# Patient Record
Sex: MALE | Race: WHITE | NOT HISPANIC OR LATINO | Employment: OTHER | URBAN - NONMETROPOLITAN AREA
[De-identification: names, ages, dates, MRNs, and addresses within clinical notes are randomized per-mention and may not be internally consistent; named-entity substitution may affect disease eponyms.]

---

## 2018-04-17 ENCOUNTER — OFFICE VISIT (OUTPATIENT)
Dept: CARDIOLOGY | Facility: CLINIC | Age: 63
End: 2018-04-17

## 2018-04-17 VITALS
BODY MASS INDEX: 45.1 KG/M2 | SYSTOLIC BLOOD PRESSURE: 134 MMHG | HEART RATE: 97 BPM | DIASTOLIC BLOOD PRESSURE: 84 MMHG | WEIGHT: 315 LBS | HEIGHT: 70 IN

## 2018-04-17 DIAGNOSIS — I48.20 CHRONIC ATRIAL FIBRILLATION (HCC): Primary | ICD-10-CM

## 2018-04-17 DIAGNOSIS — IMO0001 CLASS 3 OBESITY DUE TO EXCESS CALORIES WITHOUT SERIOUS COMORBIDITY WITH BODY MASS INDEX (BMI) OF 45.0 TO 49.9 IN ADULT: ICD-10-CM

## 2018-04-17 DIAGNOSIS — I50.22 CHRONIC SYSTOLIC CONGESTIVE HEART FAILURE (HCC): ICD-10-CM

## 2018-04-17 DIAGNOSIS — I10 ESSENTIAL HYPERTENSION: ICD-10-CM

## 2018-04-17 PROCEDURE — 99204 OFFICE O/P NEW MOD 45 MIN: CPT | Performed by: INTERNAL MEDICINE

## 2018-04-17 PROCEDURE — 93000 ELECTROCARDIOGRAM COMPLETE: CPT | Performed by: INTERNAL MEDICINE

## 2018-04-17 RX ORDER — ZINC GLUCONATE 50 MG
1 TABLET ORAL DAILY
COMMUNITY

## 2018-04-17 RX ORDER — LISINOPRIL 20 MG/1
20 TABLET ORAL DAILY
Status: ON HOLD | COMMUNITY
End: 2019-08-01

## 2018-04-17 RX ORDER — CARVEDILOL 25 MG/1
6.25 TABLET ORAL 2 TIMES DAILY WITH MEALS
Status: ON HOLD | COMMUNITY
End: 2019-08-01

## 2018-04-17 RX ORDER — ASPIRIN 81 MG/1
81 TABLET ORAL DAILY
COMMUNITY

## 2018-04-17 RX ORDER — UBIDECARENONE 50 MG
1 CAPSULE ORAL 2 TIMES DAILY
COMMUNITY

## 2018-04-17 RX ORDER — DICLOFENAC SODIUM 75 MG/1
75 TABLET, DELAYED RELEASE ORAL 2 TIMES DAILY
COMMUNITY
End: 2019-08-05 | Stop reason: HOSPADM

## 2018-04-17 RX ORDER — DOCUSATE SODIUM 100 MG/1
100 CAPSULE, LIQUID FILLED ORAL DAILY PRN
COMMUNITY

## 2018-04-17 RX ORDER — POTASSIUM CHLORIDE 750 MG/1
10 TABLET, EXTENDED RELEASE ORAL DAILY
COMMUNITY

## 2018-04-17 RX ORDER — FUROSEMIDE 40 MG/1
40 TABLET ORAL DAILY
COMMUNITY

## 2018-04-17 NOTE — PROGRESS NOTES
Arsh Salas  62 y.o. male    04/17/2018  1. Class 3 obesity due to excess calories without serious comorbidity with body mass index (BMI) of 45.0 to 49.9 in adult    2. Essential hypertension    3. Chronic systolic congestive heart failure    4. Chronic atrial fibrillation        History of Present Illness    62 years old patient with morbid obesity BMI 48, chronic atrial fibrillation with a variable ventricular response, congestive heart failure, obstructive sleep apnea on CPAP, LV dysfunction with ejection fraction 35% on echocardiographic study done in March 2018 with a moderate biatrial enlargement meter 5.2 along with a mild mitral and tricuspid regurgitation who referred for further evaluations and management.  The patient complained by his daughter and son-in-law well educated.  No cardiac evaluations of LV dysfunction palpation performed.  He is on appropriate medical management.  No chest pain reported.  No orthopnea PND was reported.  He lost proximate 5 pounds recently.  He had history of chronic back pain and bilateral knee arthritis.  He has a back surgery done in the past.  He still able to ambulate.        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 No pertinent family history.      Social History     Social History   • Marital status:      Spouse name: N/A   • Number of children: N/A   • Years of education: N/A     Occupational History   • Not on file.     Social History Main Topics   • Smoking status: Former Smoker     Quit  "date: 1978   • Smokeless tobacco: Never Used   • Alcohol use Yes      Comment: social   • Drug use: No   • Sexual activity: Defer     Other Topics Concern   • Not on file     Social History Narrative   • No narrative on file         Current Outpatient Prescriptions   Medication Sig Dispense Refill   • aspirin 81 MG EC tablet Take 81 mg by mouth Daily.     • carvedilol (COREG) 25 MG tablet Take 25 mg by mouth 2 (Two) Times a Day With Meals.     • Cholecalciferol (VITAMIN D3) 5000 units capsule capsule Take 5,000 Units by mouth Daily.     • diclofenac (VOLTAREN) 75 MG EC tablet Take 75 mg by mouth 2 (Two) Times a Day.     • docusate sodium (COLACE) 100 MG capsule Take 100 mg by mouth Daily As Needed for Constipation.     • furosemide (LASIX) 40 MG tablet Take 40 mg by mouth Daily.     • lisinopril (PRINIVIL,ZESTRIL) 20 MG tablet Take 20 mg by mouth Daily.     • potassium chloride (K-DUR,KLOR-CON) 10 MEQ CR tablet Take 10 mEq by mouth Daily.     • Red Yeast Rice 600 MG tablet Take 1 tablet by mouth 2 (Two) Times a Day.     • rivaroxaban (XARELTO) 20 MG tablet Take 20 mg by mouth Daily.     • Zinc 50 MG tablet Take 1 tablet by mouth Daily.       No current facility-administered medications for this visit.          OBJECTIVE    /84   Pulse 97   Ht 177.8 cm (70\")   Wt (!) 152 kg (334 lb)   BMI 47.92 kg/m²         Review of Systems     Constitutional:  Denies recent weight loss, weight gain, fever or chills, no change in exercise tolerance     HENT:  Denies any hearing loss, epistaxis, hoarseness, or difficulty speaking.     Eyes: Wears eyeglasses or contact lenses     Respiratory:  Obstructive sleep apnea on CPAP    Cardiovascular: The H&P    Gastrointestinal:  Denies change in bowel habits, dyspepsia, ulcer disease, hematochezia, or melena.     Endocrine: Negative for cold intolerance, heat intolerance, polydipsia, polyphagia and polyuria. Denies any history of weight change, heat/cold intolerance, polydipsia, " polyuria     Genitourinary: Negative.      Musculoskeletal: Chronic back pain status post surgery and bilateral knee arthritis    Skin:  Denies any change in hair or nails, rashes, or skin lesions.     Allergic/Immunologic: Negative.  Negative for environmental allergies, food allergies and immunocompromised state.     Neurological:  Denies any history of recurrent headaches, strokes, TIA, or seizure disorder.     Hematological: Denies any food allergies, seasonal allergies, bleeding disorders, or lymphadenopathy.     Psychiatric/Behavioral: Denies any history of depression, substance abuse, or change in cognitive function.         Physical Exam     Constitutional: Cooperative, alert and oriented, well-developed, well-nourished, in no acute distress.     HENT:   Head: Normocephalic, normal hair patterns, no masses or tenderness.  Ears, Nose, and Throat: No gross abnormalities. No pallor or cyanosis. Dentition good.   Eyes: EOMS intact, PERRL, conjunctivae and lids unremarkable. Fundoscopic exam and visual fields not performed.   Neck: No palpable masses or adenopathy, no thyromegaly, no JVD, carotid pulses are full and equal bilaterally and without  Bruits.     Cardiovascular: Regular rhythm, S1 and S2 normal, no S3 or S4. Apical impulse not displaced. No murmurs, gallops, or rubs detected.     Pulmonary/Chest: Chest: normal symmetry, no tenderness to palpation, normal respiratory excursion, no intercostal retraction, no use of accessory muscles.            Pulmonary: Normal breath sounds. No rales or ronchi.    Abdominal: Abdomen soft, bowel sounds normoactive, no masses, no hepatosplenomegaly, non-tender, no bruits.     Musculoskeletal: No deformities, clubbing, cyanosis, erythema, or edema observed. There are no spinal abnormalities noted. Normal muscle strength and tone. Pulses full and equal in all extremities, no bruits auscultated.     Neurological: No gross motor or sensory deficits noted, affect  appropriate, oriented to time, person, place.     Skin: Warm and dry to the touch, no apparent skin lesions or masses noted.     Psychiatric: He has a normal mood and affect. His behavior is normal. Judgment and thought content normal.         Procedures      No results found for: WBC, HGB, HCT, MCV, PLT  No results found for: GLUCOSE, BUN, CREATININE, EGFRIFNONA, EGFRIFAFRI, BCR, CO2, CALCIUM, PROTENTOTREF, ALBUMIN, LABIL2, AST, ALT  No results found for: CHOL  No results found for: TRIG  No results found for: HDL  No components found for: LDLCALC  No results found for: LDL  No results found for: HDLLDLRATIO  No components found for: CHOLHDL  No results found for: HGBA1C  No results found for: TSH, U9YARHS, S9DLBLS, THYROIDAB        ASSESSMENT AND PLAN  #1 congestive heart failure due to reduced left and a systolic function #2 morbid obesity with a BMI of 48 #3 hypertension with hypertensive disease #4 chronic atrial fibrillation with moderate biatrial enlargement #5  exertional dyspnea multifactorial mainly in etiology.    Clinically, no sign of cardiac decompensation at the time of evaluations.  Currently patient is being treated with Xarelto to decrease risk of cardiac embolic phenomena, lisinopril with history of hypertension hypertensive heart disease, coronary with history of LV dysfunction congestive heart failure compensated, Lasix for CHF.  [facility] given the LV dysfunction history of congestive heart failure with ejection fraction 35%.  I will also arrange 24-hour Holter to assess the heart rate control to see if need to made any adjustments medication needed at the AV emely blocking drug.  Risk Factor lifestyle/ modification discussed.  Patient was educated regarding losing weight and Dash diet explained.    Diagnoses and all orders for this visit:    Class 3 obesity due to excess calories without serious comorbidity with body mass index (BMI) of 45.0 to 49.9  in adult    Essential hypertension    Chronic systolic congestive heart failure  -     Stress Test With Myocardial Perfusion Two Day; Future    Chronic atrial fibrillation  -     Holter Monitor - 24 Hour; Future  -     Stress Test With Myocardial Perfusion Two Day; Future        Tim Hutchinson MD  4/17/2018  12:28 PM

## 2018-05-07 ENCOUNTER — APPOINTMENT (OUTPATIENT)
Dept: NUCLEAR MEDICINE | Facility: HOSPITAL | Age: 63
End: 2018-05-07
Attending: INTERNAL MEDICINE

## 2018-05-07 ENCOUNTER — TELEPHONE (OUTPATIENT)
Dept: GENERAL RADIOLOGY | Facility: HOSPITAL | Age: 63
End: 2018-05-07

## 2018-05-08 ENCOUNTER — APPOINTMENT (OUTPATIENT)
Dept: CARDIOLOGY | Facility: HOSPITAL | Age: 63
End: 2018-05-08
Attending: INTERNAL MEDICINE

## 2018-05-08 ENCOUNTER — HOSPITAL ENCOUNTER (OUTPATIENT)
Dept: NUCLEAR MEDICINE | Facility: HOSPITAL | Age: 63
Discharge: HOME | End: 2018-05-08
Attending: INTERNAL MEDICINE

## 2018-05-08 DIAGNOSIS — I50.22 CHRONIC SYSTOLIC CONGESTIVE HEART FAILURE (HCC): ICD-10-CM

## 2018-05-08 DIAGNOSIS — I48.20 CHRONIC ATRIAL FIBRILLATION (HCC): ICD-10-CM

## 2018-05-08 PROCEDURE — A9500 TC99M SESTAMIBI: HCPCS | Performed by: INTERNAL MEDICINE

## 2018-05-08 PROCEDURE — 0 TECHNETIUM SESTAMIBI: Performed by: INTERNAL MEDICINE

## 2018-05-08 RX ADMIN — TECHNETIUM TC 99M SESTAMIBI 1 DOSE: 1 INJECTION INTRAVENOUS at 08:25

## 2018-05-09 ENCOUNTER — HOSPITAL ENCOUNTER (OUTPATIENT)
Dept: CARDIOLOGY | Facility: HOSPITAL | Age: 63
Discharge: HOME | End: 2018-05-09
Attending: INTERNAL MEDICINE

## 2018-05-09 ENCOUNTER — HOSPITAL ENCOUNTER (OUTPATIENT)
Dept: NUCLEAR MEDICINE | Facility: HOSPITAL | Age: 63
Discharge: HOME | End: 2018-05-09
Attending: INTERNAL MEDICINE

## 2018-05-09 LAB
BH CV STRESS BP STAGE 1: NORMAL
BH CV STRESS COMMENTS STAGE 1: NORMAL
BH CV STRESS DOSE REGADENOSON STAGE 1: 0.4
BH CV STRESS DURATION MIN STAGE 1: 0
BH CV STRESS DURATION SEC STAGE 1: 10
BH CV STRESS HR STAGE 1: 92
BH CV STRESS PROTOCOL 1: NORMAL
BH CV STRESS RECOVERY BP: NORMAL MMHG
BH CV STRESS RECOVERY HR: 101 BPM
BH CV STRESS STAGE 1: 1
LV EF NUC BP: 69 %
MAXIMAL PREDICTED HEART RATE: 159 BPM
PERCENT MAX PREDICTED HR: 84.28 %
STRESS BASELINE BP: NORMAL MMHG
STRESS BASELINE HR: 89 BPM
STRESS PERCENT HR: 99 %
STRESS POST ESTIMATED WORKLOAD: 1 METS
STRESS POST PEAK BP: NORMAL MMHG
STRESS POST PEAK HR: 134 BPM
STRESS TARGET HR: 135 BPM

## 2018-05-09 PROCEDURE — 93016 CV STRESS TEST SUPVJ ONLY: CPT | Performed by: INTERNAL MEDICINE

## 2018-05-09 PROCEDURE — 25010000002 REGADENOSON 0.4 MG/5ML SOLUTION: Performed by: INTERNAL MEDICINE

## 2018-05-09 PROCEDURE — A9500 TC99M SESTAMIBI: HCPCS | Performed by: INTERNAL MEDICINE

## 2018-05-09 PROCEDURE — 78452 HT MUSCLE IMAGE SPECT MULT: CPT | Performed by: INTERNAL MEDICINE

## 2018-05-09 PROCEDURE — 0 TECHNETIUM SESTAMIBI: Performed by: INTERNAL MEDICINE

## 2018-05-09 PROCEDURE — 78452 HT MUSCLE IMAGE SPECT MULT: CPT

## 2018-05-09 PROCEDURE — 93017 CV STRESS TEST TRACING ONLY: CPT

## 2018-05-09 PROCEDURE — 93018 CV STRESS TEST I&R ONLY: CPT | Performed by: INTERNAL MEDICINE

## 2018-05-09 RX ORDER — 0.9 % SODIUM CHLORIDE 0.9 %
10 VIAL (ML) INJECTION AS NEEDED
Status: DISCONTINUED | OUTPATIENT
Start: 2018-05-09 | End: 2018-05-10 | Stop reason: HOSPADM

## 2018-05-09 RX ADMIN — REGADENOSON 0.4 MG: 0.08 INJECTION, SOLUTION INTRAVENOUS at 08:29

## 2018-05-09 RX ADMIN — SODIUM CHLORIDE, PRESERVATIVE FREE 10 ML: 5 INJECTION INTRAVENOUS at 08:29

## 2018-05-09 RX ADMIN — TECHNETIUM TC 99M SESTAMIBI 1 DOSE: 1 INJECTION INTRAVENOUS at 08:30

## 2018-05-17 ENCOUNTER — TELEPHONE (OUTPATIENT)
Dept: CARDIOLOGY | Facility: CLINIC | Age: 63
End: 2018-05-17

## 2018-07-17 ENCOUNTER — OFFICE VISIT (OUTPATIENT)
Dept: CARDIOLOGY | Facility: CLINIC | Age: 63
End: 2018-07-17

## 2018-07-17 VITALS
BODY MASS INDEX: 45.1 KG/M2 | HEART RATE: 95 BPM | HEIGHT: 70 IN | WEIGHT: 315 LBS | SYSTOLIC BLOOD PRESSURE: 112 MMHG | DIASTOLIC BLOOD PRESSURE: 72 MMHG

## 2018-07-17 DIAGNOSIS — I10 ESSENTIAL HYPERTENSION: ICD-10-CM

## 2018-07-17 DIAGNOSIS — IMO0001 CLASS 3 OBESITY DUE TO EXCESS CALORIES WITHOUT SERIOUS COMORBIDITY WITH BODY MASS INDEX (BMI) OF 45.0 TO 49.9 IN ADULT: ICD-10-CM

## 2018-07-17 DIAGNOSIS — I48.20 CHRONIC ATRIAL FIBRILLATION (HCC): Primary | ICD-10-CM

## 2018-07-17 DIAGNOSIS — I50.22 CHRONIC SYSTOLIC CONGESTIVE HEART FAILURE (HCC): ICD-10-CM

## 2018-07-17 PROCEDURE — 99213 OFFICE O/P EST LOW 20 MIN: CPT | Performed by: INTERNAL MEDICINE

## 2018-07-17 NOTE — PROGRESS NOTES
Arsh Salas  62 y.o. male    07/17/2018  1. Chronic atrial fibrillation (CMS/[facility])    2. Chronic systolic congestive heart failure (CMS/[facility])    3. Essential hypertension    4. Class 3 obesity due to excess calories without serious comorbidity with body mass index (BMI) of 45.0 to 49.9 in adult (CMS/[facility])        History of Present Illness    62 years old patient with morbid obesity BMI 46, chronic atrial fibrillation with a variable ventricular response, congestive heart failure, obstructive sleep apnea on CPAP, LV dysfunction with ejection fraction 35% on echocardiographic study done in March 2018 with a moderate biatrial enlargement with diameter 5.2 along with a mild mitral and tricuspid regurgitation who referred for further evaluations and management. Stress test done and reported ejection fractions greater than 60% and no evidence of any reversible ischemia.  following the stress and holter discussed with the patient.  he is a pleased with the clinical outcome.  adenosine any significant bradyarrhythmia or any persistent rapid ventricular heart rate.  his average heart rate is reasonably good  The patient complained by his daughter and son-in-law well educated.  No cardiac evaluations of LV dysfunction palpation performed.  He is on appropriate medical management.  No chest pain reported.  No orthopnea PND was reported.  He lost proximate 5 pounds recently.  He had history of chronic back pain and bilateral knee arthritis.  He has a back surgery done in the past but  able to ambulate      Stress test 5/9/20128  · Non diagonostic lexiscan  · EF-69%,Fixed apical defect, No reversible ischemia    Study Impression Myocardial perfusion imaging indicates a normal myocardial perfusion study with no evidence of ischemia. Impressions are consistent with a low risk study.      Rest Perfusion Defect 1 There is a small sized defect with mild reduction in uptake present in the apical wall.      Stress Perfusion Defect  1 There is a small sized defect of mild severity present in the apex wall.      Ventricle Size / Description Left ventricular ejection fraction is normal (Calculated EF = 69%).          HOLTER 5/2018  Predominant rhythm is atrial flutter/atrial fibrillation with average heart rate of 100 minimal 55 maximum 152 bpm.  There is a 1 nonsignificant pause of 2.1 second between 6:30 and 7 AM.  Rare isolated premature ventricular complex.   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 No pertinent family history.      Social History     Social History   • Marital status:      Spouse name: N/A   • Number of children: N/A   • Years of education: N/A     Occupational History   • Not on file.     Social History Main Topics   • Smoking status: Former Smoker     Quit date: 1978   • Smokeless tobacco: Never Used   • Alcohol use Yes      Comment: social   • Drug use: No   • Sexual activity: Defer     Other Topics Concern   • Not on file     Social History Narrative   • No narrative on file         Current Outpatient Prescriptions   Medication Sig Dispense Refill   • aspirin 81 MG EC tablet Take 81 mg by mouth Daily.     • carvedilol (COREG) 25 MG tablet Take 25 mg by mouth 2 (Two) Times a Day With Meals.     • Cholecalciferol (VITAMIN D3) 5000 units  "capsule capsule Take 5,000 Units by mouth Daily.     • diclofenac (VOLTAREN) 75 MG EC tablet Take 75 mg by mouth 2 (Two) Times a Day.     • docusate sodium (COLACE) 100 MG capsule Take 100 mg by mouth Daily As Needed for Constipation.     • furosemide (LASIX) 40 MG tablet Take 40 mg by mouth Daily.     • lisinopril (PRINIVIL,ZESTRIL) 20 MG tablet Take 20 mg by mouth Daily.     • potassium chloride (K-DUR,KLOR-CON) 10 MEQ CR tablet Take 10 mEq by mouth Daily.     • Red Yeast Rice 600 MG tablet Take 1 tablet by mouth 2 (Two) Times a Day.     • rivaroxaban (XARELTO) 20 MG tablet Take 20 mg by mouth Daily.     • Zinc 50 MG tablet Take 1 tablet by mouth Daily.       No current facility-administered medications for this visit.          OBJECTIVE    /72   Pulse 95   Ht 177.8 cm (70\")   Wt (!) 147 kg (323 lb)   BMI 46.35 kg/m²         Review of Systems     Constitutional:  Denies recent weight loss, weight gain, fever or chills, no change in exercise tolerance     HENT:  Denies any hearing loss, epistaxis, hoarseness, or difficulty speaking.     Eyes: No blurry     Respiratory:  Denies dyspnea with exertion,no cough, wheezing, or hemoptysis.     Cardiovascular: See H&P    Gastrointestinal:  Denies change in bowel habits, dyspepsia, ulcer disease, hematochezia, or melena.     Endocrine: Negative for cold intolerance, heat intolerance, polydipsia, polyphagia and polyuria. Denies any history of weight change, heat/cold intolerance, polydipsia, polyuria     Genitourinary: Negative.      Musculoskeletal: Denies any history of arthritic symptoms or back problems     Skin:  Denies any change in hair or nails, rashes, or skin lesions.     Allergic/Immunologic: Negative.  Negative for environmental allergies, food allergies and immunocompromised state.     Neurological:  Denies any history of recurrent headaches, strokes, TIA, or seizure disorder.     Hematological: Denies any food allergies, seasonal allergies, bleeding " disorders, or lymphadenopathy.     Psychiatric/Behavioral: Denies any history of depression, substance abuse, or change in cognitive function.         Physical Exam     Constitutional: Cooperative, alert and oriented, well-developed, well-nourished, in no acute distress.     HENT:   Head: Normocephalic, normal hair patterns, no masses or tenderness.  Ears, Nose, and Throat: No gross abnormalities. No pallor or cyanosis. Dentition good.   Eyes: EOMS intact, PERRL, conjunctivae and lids unremarkable. Fundoscopic exam and visual fields not performed.   Neck: No palpable masses or adenopathy, no thyromegaly, no JVD, carotid pulses are full and equal bilaterally and without  Bruits.     Cardiovascular: Regular rhythm, S1 and S2 normal, no S3 or S4. Apical impulse not displaced. No murmurs, gallops, or rubs detected.     Pulmonary/Chest: Chest: normal symmetry, no tenderness to palpation, normal respiratory excursion, no intercostal retraction, no use of accessory muscles.            Pulmonary: Normal breath sounds. No rales or ronchi.    Abdominal: Abdomen soft, bowel sounds normoactive, no masses, no hepatosplenomegaly, non-tender, no bruits.     Musculoskeletal: No deformities, clubbing, cyanosis, erythema, or edema observed. There are no spinal abnormalities noted. Normal muscle strength and tone. Pulses full and equal in all extremities, no bruits auscultated.     Neurological: No gross motor or sensory deficits noted, affect appropriate, oriented to time, person, place.     Skin: Warm and dry to the touch, no apparent skin lesions or masses noted.     Psychiatric: He has a normal mood and affect. His behavior is normal. Judgment and thought content normal.         Procedures      No results found for: WBC, HGB, HCT, MCV, PLT  No results found for: GLUCOSE, BUN, CREATININE, EGFRIFNONA, EGFRIFAFRI, BCR, CO2, CALCIUM, PROTENTOTREF, ALBUMIN, LABIL2, AST, ALT  No results found for: CHOL  No results found for: TRIG  No  results found for: HDL  No components found for: LDLCALC  No results found for: LDL  No results found for: HDLLDLRATIO  No components found for: CHOLHDL  No results found for: HGBA1C  No results found for: TSH, T7HXWOT, C6ONYQU, THYROIDAB        ASSESSMENT AND PLAN  #1 congestive heart failure due to reduced left and a systolic function #2 morbid obesity with a BMI of 48 #3 hypertension with hypertensive disease #4 chronic atrial fibrillation with moderate biatrial enlargement #5  exertional dyspnea multifactorial mainly in etiology.     Clinically, no sign of cardiac decompensation at the time of evaluations.  Currently patient is being treated with Xarelto to decrease risk of cardiac embolic phenomena, lisinopril with history of hypertension hypertensive heart disease, coronary with history of LV dysfunction congestive heart failure compensated, Lasix for CHF compensated with a significant improvement in heart function on stress test.   Stress tests ejection fraction improved to greater than 60% and no evidence of reversibility.  Holter no evidence of significant bradyarrhythmia or any pause noted with good average heart rate..  Risk Factor lifestyle/ modification discussed.  Patient was educated regarding losing weight and Dash diet explained patient will continue lisinopril for management of hypertension with hypertensive heart disease, carvedilol with history of hypertension and congestive heart failure as a part of AV emely blocking drug and Xarelto to decrease risk of cardiac embolic phenomena.  We'll see him back in 6 month R depends on patient clinical conditions.    Arsh was seen today for follow-up.    Diagnoses and all orders for this visit:    Chronic atrial fibrillation (CMS/HCC)    Chronic systolic congestive heart failure (CMS/HCC)    Essential hypertension    Class 3 obesity due to excess calories without serious comorbidity with body mass index (BMI) of 45.0 to 49.9 in adult  (CMS/HCC)        Tim Hutchinson MD  7/17/2018  11:00 AM

## 2019-01-18 ENCOUNTER — OFFICE VISIT (OUTPATIENT)
Dept: CARDIOLOGY | Facility: CLINIC | Age: 64
End: 2019-01-18

## 2019-01-18 VITALS
OXYGEN SATURATION: 96 % | HEIGHT: 70 IN | HEART RATE: 60 BPM | BODY MASS INDEX: 45.1 KG/M2 | SYSTOLIC BLOOD PRESSURE: 124 MMHG | WEIGHT: 315 LBS | DIASTOLIC BLOOD PRESSURE: 76 MMHG

## 2019-01-18 DIAGNOSIS — I48.20 CHRONIC ATRIAL FIBRILLATION (HCC): Primary | ICD-10-CM

## 2019-01-18 DIAGNOSIS — I50.22 CHRONIC SYSTOLIC CONGESTIVE HEART FAILURE (HCC): ICD-10-CM

## 2019-01-18 DIAGNOSIS — I10 ESSENTIAL HYPERTENSION: ICD-10-CM

## 2019-01-18 PROCEDURE — 99214 OFFICE O/P EST MOD 30 MIN: CPT | Performed by: INTERNAL MEDICINE

## 2019-01-18 NOTE — PROGRESS NOTES
Arsh Salas  63 y.o. male    01/18/2019  1. Chronic atrial fibrillation (CMS/HCC)    2. Chronic systolic congestive heart failure (CMS/HCC)    3. Essential hypertension        History of Present Illness:  Patient's Body mass index is 45.92 kg/m². BMI is above normal parameters. Recommendations include: exercise counseling, nutrition counseling and referral to primary care.    63 years old patient with morbid obesity BMI 45.9, patient lost approximately 20% compared to the last visit he is on the right tract was given educated regarding lifestyle modification chronic atrial fibrillation with a variable ventricular response, congestive heart failure, obstructive sleep apnea on CPAP, LV dysfunction with ejection fraction 35% on echocardiographic study done in March 2018 with a moderate biatrial enlargement with diameter 5.2 along with a mild mitral and tricuspid regurgitation who referred for further evaluations and management. Stress test done and reported ejection fractions greater than 60% and no evidence of any reversible ischemia.   he is a pleased with the clinical outcome.  No significant bradyarrhythmia or persistent rapid ventricular heart rate less than 40 bpm.   with good average heart rate   Was accompanied by his daughter and son-in-law well educated.  No cardiac evaluations of LV dysfunction palpation performed.  He is on appropriate medical management.  No chest pain reported.  No orthopnea PND was reported.  He lost proximate 5 pounds recently.  He had history of chronic back pain and bilateral knee arthritis.  He has a back surgery done in the past but  able to ambulate        Stress test 5/9/20128  · Non diagonostic lexiscan  · EF-69%,Fixed apical defect, No reversible ischemia     Study Impression Myocardial perfusion imaging indicates a normal myocardial perfusion study with no evidence of ischemia. Impressions are consistent with a low risk study.      Rest Perfusion Defect 1 There is a  small sized defect with mild reduction in uptake present in the apical wall.      Stress Perfusion Defect 1 There is a small sized defect of mild severity present in the apex wall.      Ventricle Size / Description Left ventricular ejection fraction is normal (Calculated EF = 69%).            HOLTER 2018  Predominant rhythm is atrial flutter/atrial fibrillation with average heart rate of 100 minimal 55 maximum 152 bpm.  There is a 1 nonsignificant pause of 2.1 second between 6:30 and 7 AM.  Rare isolated premature ventricular complex.   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      Social History     Socioeconomic History   • Marital status:      Spouse name: Not on file   • Number of children: Not on file   • Years of education: Not on file   • Highest education level: Not on file   Social Needs   • Financial resource strain: Not on file   • Food insecurity - worry: Not on file   • Food insecurity - inability: Not on file   • Transportation needs - medical: Not on file   • Transportation needs - non-medical: Not on file   Occupational History   • Not on file   Tobacco Use   • Smoking status: Former Smoker     Last attempt to quit:      Years since quittin.0   • Smokeless tobacco: Never Used    Substance and Sexual Activity   • Alcohol use: Yes     Comment: social   • Drug use: No   • Sexual activity: Defer   Other Topics Concern   • Not on file   Social History Narrative   • Not on file         Current Outpatient Medications   Medication Sig Dispense Refill   • aspirin 81 MG EC tablet Take 81 mg by mouth Daily.     • carvedilol (COREG) 25 MG tablet Take 25 mg by mouth 2 (Two) Times a Day With Meals.     • Cholecalciferol (VITAMIN D3) 5000 units capsule capsule Take 5,000 Units by mouth Daily.     • diclofenac (VOLTAREN) 75 MG EC tablet Take 75 mg by mouth 2 (Two) Times a Day.     • docusate sodium (COLACE) 100 MG capsule Take 100 mg by mouth Daily As Needed for Constipation.     • furosemide (LASIX) 40 MG tablet Take 40 mg by mouth Daily.     • potassium chloride (K-DUR,KLOR-CON) 10 MEQ CR tablet Take 10 mEq by mouth Daily.     • Red Yeast Rice 600 MG tablet Take 1 tablet by mouth 2 (Two) Times a Day.     • Zinc 50 MG tablet Take 1 tablet by mouth Daily.     • lisinopril (PRINIVIL,ZESTRIL) 20 MG tablet Take 20 mg by mouth Daily.     • rivaroxaban (XARELTO) 20 MG tablet Take 20 mg by mouth Daily.       No current facility-administered medications for this visit.            Review of Systems:     Constitutional:  Denies recent weight loss, weight gain, fever or chills, no change in exercise tolerance.     HENT:  Denies any hearing loss, epistaxis, hoarseness, or difficulty speaking.     Eyes: Wears eyeglasses or contact lenses.    Respiratory:  Denies dyspnea with exertion,no cough, wheezing, or hemoptysis.     Cardiovascular: Negative for palpations, chest pain, orthopnea, PND, peripheral edema, syncope, or claudication.     Gastrointestinal:  Denies change in bowel habits, dyspepsia, ulcer disease, hematochezia, or melena.     Endocrine: Negative for cold intolerance, heat intolerance, polydipsia, polyphagia and polyuria. Denies any history of weight change, polydipsia, polyuria.     Genitourinary:  "Negative.      Musculoskeletal: Osteoarthritis of knee    Skin:  Denies any change in hair or nails, rashes, or skin lesions.     Allergic/Immunologic: Negative.  Negative for environmental allergies, food allergies and immunocompromised state.     Neurological:  Denies any history of recurrent headaches, strokes, TIA, or seizure disorder.     Hematological: Denies any food allergies, seasonal allergies, bleeding disorders, or lymphadenopathy.     Psychiatric/Behavioral: Denies any history of depression, substance abuse, or change in cognitive function.       OBJECTIVE:    /76   Pulse 60   Ht 177.8 cm (70\")   Wt (!) 145 kg (320 lb)   SpO2 96%   BMI 45.92 kg/m²       Physical Exam:     Constitutional: Cooperative, alert and oriented, well-developed, well-nourished, in no acute distress.     HENT:   Head: Normocephalic, normal hair patterns, no masses or tenderness.  Ears, Nose, and Throat: No gross abnormalities. No pallor or cyanosis. Dentition good.   Eyes: EOMS intact, PERRL, conjunctivae and lids unremarkable. Fundoscopic exam and visual fields not performed.   Neck: No palpable masses or adenopathy, no thyromegaly, no JVD, carotid pulses are full and equal bilaterally and without  Bruits.     Cardiovascular: Regular rhythm, S1 and S2 normal, no S3 or S4. Apical impulse not displaced. No murmurs, gallops, or rubs detected.     Pulmonary/Chest: Chest: normal symmetry, no tenderness to palpation, normal respiratory excursion, no intercostal retraction, no use of accessory muscles. Pulmonary: Normal breath sounds. No rales or rhonchi.    Abdominal: Abdomen soft, bowel sounds normoactive, no masses, no hepatosplenomegaly, non-tender, no bruits.     Musculoskeletal: No deformities, clubbing, cyanosis, erythema, or edema observed. There are no spinal abnormalities noted. Normal muscle strength and tone. Pulses full and equal in all extremities, no bruits auscultated.     Neurological: No gross motor or " sensory deficits noted, affect appropriate, oriented to time, person, place.     Skin: Warm and dry to the touch, no apparent skin lesions or masses noted.     Psychiatric: He has a normal mood and affect. His behavior is normal. Judgment and thought content normal.         Procedures      No results found for: WBC, HGB, HCT, MCV, PLT  No results found for: GLUCOSE, BUN, CREATININE, EGFRIFNONA, EGFRIFAFRI, BCR, CO2, CALCIUM, PROTENTOTREF, ALBUMIN, LABIL2, AST, ALT  No results found for: CHOL  No results found for: TRIG  No results found for: HDL  No components found for: LDLCALC  No results found for: LDL  No results found for: HDLLDLRATIO  No components found for: CHOLHDL  No results found for: HGBA1C  No results found for: TSH, J3ACVQV, J6DZPLQ, THYROIDAB        ASSESSMENT AND PLAN:  #1 congestive heart failure due to reduced left and a systolic function with a significant improvement and compensated #2 morbid obesity with a BMI of 45 #3 hypertension with hypertensive disease #4 chronic atrial fibrillation with moderate biatrial enlargement #5  exertional dyspnea multifactorial mainly in etiology.     Clinically, no sign of cardiac decompensation at the time of evaluations.  Currently patient is being treated with Xarelto to decrease risk of cardiac embolic phenomena, lisinopril with history of hypertension hypertensive heart disease,  with history of LV dysfunction congestive heart failure compensated with a significant improvement in left systolic function on previous stress test, Lasix for CHF compensated with a significant improvement in heart function on stress test.   Stress tests ejection fraction improved to greater than 60% and no evidence of reversibility.  Holter no evidence of significant bradyarrhythmia or any pause noted with good average heart rate..  Risk Factor lifestyle/ modification discussed.  Patient was educated regarding losing weight and Dash diet explained patient will continue lisinopril  for management of hypertension with hypertensive heart disease, carvedilol with history of hypertension and congestive heart failure as a part of AV emely blocking drug and Xarelto to decrease risk of cardiac embolic phenomena.  We'll see him back in 6 month R depends on patient clinical conditions.        Arsh was seen today for follow-up.    Diagnoses and all orders for this visit:    Chronic atrial fibrillation (CMS/HCC)    Chronic systolic congestive heart failure (CMS/HCC)    Essential hypertension        Tim Hutchinson MD  1/18/2019  10:50 AM

## 2019-07-22 DIAGNOSIS — I48.20 CHRONIC ATRIAL FIBRILLATION (HCC): Primary | ICD-10-CM

## 2019-07-31 ENCOUNTER — HOSPITAL ENCOUNTER (INPATIENT)
Facility: HOSPITAL | Age: 64
LOS: 5 days | Discharge: HOME | End: 2019-08-05
Attending: INTERNAL MEDICINE | Admitting: HOSPITALIST

## 2019-07-31 PROBLEM — E78.00 HYPERCHOLESTEROLEMIA: Chronic | Status: ACTIVE | Noted: 2019-07-31

## 2019-07-31 PROBLEM — I10 HYPERTENSION: Chronic | Status: ACTIVE | Noted: 2018-04-17

## 2019-07-31 PROBLEM — IMO0001 CLASS 3 OBESITY DUE TO EXCESS CALORIES WITHOUT SERIOUS COMORBIDITY WITH BODY MASS INDEX (BMI) OF 45.0 TO 49.9 IN ADULT: Chronic | Status: ACTIVE | Noted: 2018-04-17

## 2019-07-31 PROBLEM — I50.23 ACUTE ON CHRONIC SYSTOLIC CHF (CONGESTIVE HEART FAILURE) (HCC): Chronic | Status: ACTIVE | Noted: 2018-04-17

## 2019-07-31 PROBLEM — I48.91 A-FIB (HCC): Status: ACTIVE | Noted: 2019-07-31

## 2019-07-31 PROBLEM — I48.20 CHRONIC ATRIAL FIBRILLATION (HCC): Chronic | Status: ACTIVE | Noted: 2018-04-17

## 2019-07-31 PROBLEM — N39.0 ACUTE UTI (URINARY TRACT INFECTION): Status: ACTIVE | Noted: 2019-07-31

## 2019-07-31 PROBLEM — E66.01 MORBID OBESITY (HCC): Chronic | Status: ACTIVE | Noted: 2019-07-31

## 2019-07-31 PROBLEM — I50.23 ACUTE ON CHRONIC SYSTOLIC CHF (CONGESTIVE HEART FAILURE) (HCC): Status: ACTIVE | Noted: 2018-04-17

## 2019-07-31 PROBLEM — N40.0 BPH (BENIGN PROSTATIC HYPERPLASIA): Chronic | Status: ACTIVE | Noted: 2019-07-31

## 2019-07-31 PROCEDURE — 25010000002 CEFTRIAXONE PER 250 MG: Performed by: INTERNAL MEDICINE

## 2019-07-31 PROCEDURE — 93010 ELECTROCARDIOGRAM REPORT: CPT | Performed by: INTERNAL MEDICINE

## 2019-07-31 PROCEDURE — 93005 ELECTROCARDIOGRAM TRACING: CPT | Performed by: INTERNAL MEDICINE

## 2019-07-31 PROCEDURE — 25010000002 FUROSEMIDE PER 20 MG: Performed by: INTERNAL MEDICINE

## 2019-07-31 RX ORDER — SODIUM CHLORIDE 0.9 % (FLUSH) 0.9 %
3-10 SYRINGE (ML) INJECTION AS NEEDED
Status: DISCONTINUED | OUTPATIENT
Start: 2019-07-31 | End: 2019-08-05 | Stop reason: HOSPADM

## 2019-07-31 RX ORDER — FINASTERIDE 5 MG/1
5 TABLET, FILM COATED ORAL DAILY
Status: DISCONTINUED | OUTPATIENT
Start: 2019-07-31 | End: 2019-08-05 | Stop reason: HOSPADM

## 2019-07-31 RX ORDER — OXYBUTYNIN CHLORIDE 10 MG/1
10 TABLET, EXTENDED RELEASE ORAL NIGHTLY
Status: DISCONTINUED | OUTPATIENT
Start: 2019-07-31 | End: 2019-08-01

## 2019-07-31 RX ORDER — ASPIRIN 81 MG/1
81 TABLET ORAL DAILY
Status: DISCONTINUED | OUTPATIENT
Start: 2019-07-31 | End: 2019-08-01

## 2019-07-31 RX ORDER — CARVEDILOL 25 MG/1
25 TABLET ORAL 2 TIMES DAILY WITH MEALS
Status: DISCONTINUED | OUTPATIENT
Start: 2019-07-31 | End: 2019-08-01

## 2019-07-31 RX ORDER — SODIUM CHLORIDE 0.9 % (FLUSH) 0.9 %
3 SYRINGE (ML) INJECTION EVERY 12 HOURS SCHEDULED
Status: DISCONTINUED | OUTPATIENT
Start: 2019-07-31 | End: 2019-08-05 | Stop reason: HOSPADM

## 2019-07-31 RX ORDER — FUROSEMIDE 10 MG/ML
20 INJECTION INTRAMUSCULAR; INTRAVENOUS EVERY 12 HOURS
Status: DISCONTINUED | OUTPATIENT
Start: 2019-07-31 | End: 2019-08-01

## 2019-07-31 RX ORDER — LISINOPRIL 5 MG/1
5 TABLET ORAL DAILY
Status: DISCONTINUED | OUTPATIENT
Start: 2019-07-31 | End: 2019-08-01

## 2019-07-31 RX ORDER — TAMSULOSIN HYDROCHLORIDE 0.4 MG/1
0.4 CAPSULE ORAL NIGHTLY
Status: DISCONTINUED | OUTPATIENT
Start: 2019-07-31 | End: 2019-08-01

## 2019-07-31 RX ADMIN — ASPIRIN 81 MG: 81 TABLET ORAL at 21:06

## 2019-07-31 RX ADMIN — SODIUM CHLORIDE, PRESERVATIVE FREE: 5 INJECTION INTRAVENOUS at 21:07

## 2019-07-31 RX ADMIN — FINASTERIDE 5 MG: 5 TABLET, FILM COATED ORAL at 22:48

## 2019-07-31 RX ADMIN — CEFTRIAXONE SODIUM 1 G: 1 INJECTION, POWDER, FOR SOLUTION INTRAMUSCULAR; INTRAVENOUS at 22:48

## 2019-07-31 RX ADMIN — TAMSULOSIN HYDROCHLORIDE 0.4 MG: 0.4 CAPSULE ORAL at 22:48

## 2019-07-31 RX ADMIN — RIVAROXABAN 20 MG: 10 TABLET, FILM COATED ORAL at 21:06

## 2019-07-31 RX ADMIN — OXYBUTYNIN CHLORIDE 10 MG: 10 TABLET, EXTENDED RELEASE ORAL at 21:06

## 2019-07-31 RX ADMIN — CARVEDILOL 25 MG: 25 TABLET, FILM COATED ORAL at 21:06

## 2019-07-31 RX ADMIN — DILTIAZEM HYDROCHLORIDE 15 MG/HR: 5 INJECTION INTRAVENOUS at 21:07

## 2019-07-31 RX ADMIN — FUROSEMIDE 20 MG: 10 INJECTION, SOLUTION INTRAVENOUS at 21:06

## 2019-08-01 ENCOUNTER — APPOINTMENT (OUTPATIENT)
Dept: CARDIOLOGY | Facility: HOSPITAL | Age: 64
End: 2019-08-01

## 2019-08-01 ENCOUNTER — APPOINTMENT (OUTPATIENT)
Dept: GENERAL RADIOLOGY | Facility: HOSPITAL | Age: 64
End: 2019-08-01

## 2019-08-01 ENCOUNTER — APPOINTMENT (OUTPATIENT)
Dept: CT IMAGING | Facility: HOSPITAL | Age: 64
End: 2019-08-01

## 2019-08-01 ENCOUNTER — APPOINTMENT (OUTPATIENT)
Dept: ULTRASOUND IMAGING | Facility: HOSPITAL | Age: 64
End: 2019-08-01

## 2019-08-01 PROBLEM — R31.9 HEMATURIA: Status: ACTIVE | Noted: 2019-08-01

## 2019-08-01 PROBLEM — G47.33 OBSTRUCTIVE SLEEP APNEA: Chronic | Status: ACTIVE | Noted: 2019-08-01

## 2019-08-01 PROBLEM — R60.0 LEG EDEMA: Chronic | Status: ACTIVE | Noted: 2019-08-01

## 2019-08-01 LAB
ANION GAP SERPL CALCULATED.3IONS-SCNC: 12 MMOL/L (ref 5–15)
ANION GAP SERPL CALCULATED.3IONS-SCNC: 13 MMOL/L (ref 5–15)
APTT PPP: 39.3 SECONDS (ref 20–40.3)
ARTERIAL PATENCY WRIST A: POSITIVE
ATMOSPHERIC PRESS: 749 MMHG
BACTERIA UR QL AUTO: ABNORMAL /HPF
BASE EXCESS BLDA CALC-SCNC: 0.7 MMOL/L (ref 0–2)
BASOPHILS # BLD AUTO: 0.06 10*3/MM3 (ref 0–0.2)
BASOPHILS NFR BLD AUTO: 0.4 % (ref 0–1.5)
BDY SITE: NORMAL
BH CV ECHO MEAS - ACS: 2.3 CM
BH CV ECHO MEAS - AO MAX PG (FULL): 1.7 MMHG
BH CV ECHO MEAS - AO MAX PG: 4.5 MMHG
BH CV ECHO MEAS - AO MEAN PG (FULL): 2 MMHG
BH CV ECHO MEAS - AO MEAN PG: 3 MMHG
BH CV ECHO MEAS - AO ROOT AREA (BSA CORRECTED): 1.4
BH CV ECHO MEAS - AO ROOT AREA: 10.2 CM^2
BH CV ECHO MEAS - AO ROOT DIAM: 3.6 CM
BH CV ECHO MEAS - AO V2 MAX: 106 CM/SEC
BH CV ECHO MEAS - AO V2 MEAN: 82.3 CM/SEC
BH CV ECHO MEAS - AO V2 VTI: 18.8 CM
BH CV ECHO MEAS - AVA(I,A): 3.3 CM^2
BH CV ECHO MEAS - AVA(I,D): 3.3 CM^2
BH CV ECHO MEAS - AVA(V,A): 3.9 CM^2
BH CV ECHO MEAS - AVA(V,D): 3.9 CM^2
BH CV ECHO MEAS - BSA(HAYCOCK): 2.8 M^2
BH CV ECHO MEAS - BSA: 2.6 M^2
BH CV ECHO MEAS - BZI_BMI: 47.2 KILOGRAMS/M^2
BH CV ECHO MEAS - BZI_METRIC_HEIGHT: 177.8 CM
BH CV ECHO MEAS - BZI_METRIC_WEIGHT: 149.2 KG
BH CV ECHO MEAS - EDV(CUBED): 315.8 ML
BH CV ECHO MEAS - EDV(TEICH): 240 ML
BH CV ECHO MEAS - EF(CUBED): 54.4 %
BH CV ECHO MEAS - EF(TEICH): 45.1 %
BH CV ECHO MEAS - ESV(CUBED): 143.9 ML
BH CV ECHO MEAS - ESV(TEICH): 131.8 ML
BH CV ECHO MEAS - FS: 23.1 %
BH CV ECHO MEAS - IVS/LVPW: 1.6
BH CV ECHO MEAS - IVSD: 1.4 CM
BH CV ECHO MEAS - LA DIMENSION: 5.9 CM
BH CV ECHO MEAS - LA/AO: 1.6
BH CV ECHO MEAS - LV MASS(C)D: 345.2 GRAMS
BH CV ECHO MEAS - LV MASS(C)DI: 133.8 GRAMS/M^2
BH CV ECHO MEAS - LV MAX PG: 2.8 MMHG
BH CV ECHO MEAS - LV MEAN PG: 1 MMHG
BH CV ECHO MEAS - LV V1 MAX: 83.5 CM/SEC
BH CV ECHO MEAS - LV V1 MEAN: 54.1 CM/SEC
BH CV ECHO MEAS - LV V1 VTI: 12.8 CM
BH CV ECHO MEAS - LVIDD: 6.8 CM
BH CV ECHO MEAS - LVIDS: 5.2 CM
BH CV ECHO MEAS - LVOT AREA (M): 4.9 CM^2
BH CV ECHO MEAS - LVOT AREA: 4.9 CM^2
BH CV ECHO MEAS - LVOT DIAM: 2.5 CM
BH CV ECHO MEAS - LVPWD: 0.84 CM
BH CV ECHO MEAS - MR MAX PG: 67.2 MMHG
BH CV ECHO MEAS - MR MAX VEL: 410 CM/SEC
BH CV ECHO MEAS - MV DEC SLOPE: 395 CM/SEC^2
BH CV ECHO MEAS - MV E MAX VEL: 99.9 CM/SEC
BH CV ECHO MEAS - MV MAX PG: 4.2 MMHG
BH CV ECHO MEAS - MV MEAN PG: 2 MMHG
BH CV ECHO MEAS - MV P1/2T MAX VEL: 103 CM/SEC
BH CV ECHO MEAS - MV P1/2T: 76.4 MSEC
BH CV ECHO MEAS - MV V2 MAX: 102 CM/SEC
BH CV ECHO MEAS - MV V2 MEAN: 56.8 CM/SEC
BH CV ECHO MEAS - MV V2 VTI: 25.7 CM
BH CV ECHO MEAS - MVA P1/2T LCG: 2.1 CM^2
BH CV ECHO MEAS - MVA(P1/2T): 2.9 CM^2
BH CV ECHO MEAS - MVA(VTI): 2.4 CM^2
BH CV ECHO MEAS - PA MAX PG: 2.3 MMHG
BH CV ECHO MEAS - PA V2 MAX: 76 CM/SEC
BH CV ECHO MEAS - RAP SYSTOLE: 10 MMHG
BH CV ECHO MEAS - RVDD: 3.6 CM
BH CV ECHO MEAS - RVSP: 35.4 MMHG
BH CV ECHO MEAS - SI(AO): 74.2 ML/M^2
BH CV ECHO MEAS - SI(CUBED): 66.7 ML/M^2
BH CV ECHO MEAS - SI(LVOT): 24.4 ML/M^2
BH CV ECHO MEAS - SI(TEICH): 42 ML/M^2
BH CV ECHO MEAS - SV(AO): 191.4 ML
BH CV ECHO MEAS - SV(CUBED): 171.9 ML
BH CV ECHO MEAS - SV(LVOT): 62.8 ML
BH CV ECHO MEAS - SV(TEICH): 108.2 ML
BH CV ECHO MEAS - TR MAX VEL: 252 CM/SEC
BILIRUB UR QL STRIP: ABNORMAL
BUN BLD-MCNC: 12 MG/DL (ref 8–23)
BUN BLD-MCNC: 14 MG/DL (ref 8–23)
BUN/CREAT SERPL: 11.8 (ref 7–25)
BUN/CREAT SERPL: 15.1 (ref 7–25)
CALCIUM SPEC-SCNC: 8.9 MG/DL (ref 8.6–10.5)
CALCIUM SPEC-SCNC: 9 MG/DL (ref 8.6–10.5)
CHLORIDE SERPL-SCNC: 101 MMOL/L (ref 98–107)
CHLORIDE SERPL-SCNC: 103 MMOL/L (ref 98–107)
CLARITY UR: CLEAR
CO2 SERPL-SCNC: 23 MMOL/L (ref 22–29)
CO2 SERPL-SCNC: 24 MMOL/L (ref 22–29)
COLOR UR: ABNORMAL
CREAT BLD-MCNC: 0.93 MG/DL (ref 0.76–1.27)
CREAT BLD-MCNC: 1.02 MG/DL (ref 0.76–1.27)
DEPRECATED RDW RBC AUTO: 49.3 FL (ref 37–54)
EOSINOPHIL # BLD AUTO: 0.04 10*3/MM3 (ref 0–0.4)
EOSINOPHIL NFR BLD AUTO: 0.2 % (ref 0.3–6.2)
ERYTHROCYTE [DISTWIDTH] IN BLOOD BY AUTOMATED COUNT: 14.8 % (ref 12.3–15.4)
GAS FLOW AIRWAY: 2 LPM
GFR SERPL CREATININE-BSD FRML MDRD: 74 ML/MIN/1.73
GFR SERPL CREATININE-BSD FRML MDRD: 82 ML/MIN/1.73
GLUCOSE BLD-MCNC: 153 MG/DL (ref 65–99)
GLUCOSE BLD-MCNC: 154 MG/DL (ref 65–99)
GLUCOSE UR STRIP-MCNC: NEGATIVE MG/DL
HCO3 BLDA-SCNC: 24.4 MMOL/L (ref 20–26)
HCT VFR BLD AUTO: 40.9 % (ref 37.5–51)
HGB BLD-MCNC: 13.2 G/DL (ref 13–17.7)
HGB UR QL STRIP.AUTO: ABNORMAL
HYALINE CASTS UR QL AUTO: ABNORMAL /LPF
IMM GRANULOCYTES # BLD AUTO: 0.1 10*3/MM3 (ref 0–0.05)
IMM GRANULOCYTES NFR BLD AUTO: 0.6 % (ref 0–0.5)
INR PPP: 2.77 (ref 0.8–1.2)
KETONES UR QL STRIP: NEGATIVE
LEUKOCYTE ESTERASE UR QL STRIP.AUTO: ABNORMAL
LYMPHOCYTES # BLD AUTO: 1.38 10*3/MM3 (ref 0.7–3.1)
LYMPHOCYTES NFR BLD AUTO: 8.6 % (ref 19.6–45.3)
Lab: NORMAL
MAXIMAL PREDICTED HEART RATE: 158 BPM
MCH RBC QN AUTO: 29.5 PG (ref 26.6–33)
MCHC RBC AUTO-ENTMCNC: 32.3 G/DL (ref 31.5–35.7)
MCV RBC AUTO: 91.3 FL (ref 79–97)
MODALITY: NORMAL
MONOCYTES # BLD AUTO: 1.35 10*3/MM3 (ref 0.1–0.9)
MONOCYTES NFR BLD AUTO: 8.4 % (ref 5–12)
NEUTROPHILS # BLD AUTO: 13.17 10*3/MM3 (ref 1.7–7)
NEUTROPHILS NFR BLD AUTO: 81.8 % (ref 42.7–76)
NITRITE UR QL STRIP: NEGATIVE
NRBC BLD AUTO-RTO: 0 /100 WBC (ref 0–0.2)
NT-PROBNP SERPL-MCNC: 1000 PG/ML (ref 5–900)
PCO2 BLDA: 35.6 MM HG (ref 35–45)
PH BLDA: 7.45 PH UNITS (ref 7.35–7.45)
PH UR STRIP.AUTO: 5.5 [PH] (ref 5–9)
PLATELET # BLD AUTO: 129 10*3/MM3 (ref 140–450)
PMV BLD AUTO: 12 FL (ref 6–12)
PO2 BLDA: 87.9 MM HG (ref 83–108)
POTASSIUM BLD-SCNC: 4.1 MMOL/L (ref 3.5–5.2)
POTASSIUM BLD-SCNC: 4.1 MMOL/L (ref 3.5–5.2)
PROT UR QL STRIP: ABNORMAL
PROTHROMBIN TIME: 29 SECONDS (ref 11.1–15.3)
RBC # BLD AUTO: 4.48 10*6/MM3 (ref 4.14–5.8)
RBC # UR: ABNORMAL /HPF
RBC MORPH BLD: NORMAL
REF LAB TEST METHOD: ABNORMAL
SAO2 % BLDCOA: 97.3 % (ref 94–99)
SMALL PLATELETS BLD QL SMEAR: NORMAL
SODIUM BLD-SCNC: 138 MMOL/L (ref 136–145)
SODIUM BLD-SCNC: 138 MMOL/L (ref 136–145)
SP GR UR STRIP: 1.07 (ref 1–1.03)
SQUAMOUS #/AREA URNS HPF: ABNORMAL /HPF
STRESS TARGET HR: 134 BPM
TROPONIN T SERPL-MCNC: <0.01 NG/ML (ref 0–0.03)
UROBILINOGEN UR QL STRIP: ABNORMAL
VENTILATOR MODE: NORMAL
WBC MORPH BLD: NORMAL
WBC NRBC COR # BLD: 16.1 10*3/MM3 (ref 3.4–10.8)
WBC UR QL AUTO: ABNORMAL /HPF

## 2019-08-01 PROCEDURE — 83880 ASSAY OF NATRIURETIC PEPTIDE: CPT | Performed by: NURSE PRACTITIONER

## 2019-08-01 PROCEDURE — 25010000002 PERFLUTREN (DEFINITY) 8.476 MG IN SODIUM CHLORIDE 0.9 % 10 ML INJECTION: Performed by: INTERNAL MEDICINE

## 2019-08-01 PROCEDURE — 81001 URINALYSIS AUTO W/SCOPE: CPT | Performed by: INTERNAL MEDICINE

## 2019-08-01 PROCEDURE — 93306 TTE W/DOPPLER COMPLETE: CPT

## 2019-08-01 PROCEDURE — 0 IOPAMIDOL PER 1 ML: Performed by: INTERNAL MEDICINE

## 2019-08-01 PROCEDURE — 71275 CT ANGIOGRAPHY CHEST: CPT

## 2019-08-01 PROCEDURE — 85025 COMPLETE CBC W/AUTO DIFF WBC: CPT | Performed by: INTERNAL MEDICINE

## 2019-08-01 PROCEDURE — 93970 EXTREMITY STUDY: CPT

## 2019-08-01 PROCEDURE — 93306 TTE W/DOPPLER COMPLETE: CPT | Performed by: INTERNAL MEDICINE

## 2019-08-01 PROCEDURE — 87086 URINE CULTURE/COLONY COUNT: CPT | Performed by: INTERNAL MEDICINE

## 2019-08-01 PROCEDURE — 80048 BASIC METABOLIC PNL TOTAL CA: CPT | Performed by: INTERNAL MEDICINE

## 2019-08-01 PROCEDURE — 85610 PROTHROMBIN TIME: CPT | Performed by: INTERNAL MEDICINE

## 2019-08-01 PROCEDURE — 85730 THROMBOPLASTIN TIME PARTIAL: CPT | Performed by: INTERNAL MEDICINE

## 2019-08-01 PROCEDURE — 85007 BL SMEAR W/DIFF WBC COUNT: CPT | Performed by: INTERNAL MEDICINE

## 2019-08-01 PROCEDURE — 93005 ELECTROCARDIOGRAM TRACING: CPT | Performed by: INTERNAL MEDICINE

## 2019-08-01 PROCEDURE — 84484 ASSAY OF TROPONIN QUANT: CPT | Performed by: INTERNAL MEDICINE

## 2019-08-01 PROCEDURE — 25010000002 FUROSEMIDE PER 20 MG: Performed by: INTERNAL MEDICINE

## 2019-08-01 PROCEDURE — 71045 X-RAY EXAM CHEST 1 VIEW: CPT

## 2019-08-01 PROCEDURE — 99222 1ST HOSP IP/OBS MODERATE 55: CPT | Performed by: NURSE PRACTITIONER

## 2019-08-01 PROCEDURE — 93010 ELECTROCARDIOGRAM REPORT: CPT | Performed by: INTERNAL MEDICINE

## 2019-08-01 PROCEDURE — 82803 BLOOD GASES ANY COMBINATION: CPT

## 2019-08-01 PROCEDURE — 36600 WITHDRAWAL OF ARTERIAL BLOOD: CPT

## 2019-08-01 RX ORDER — FUROSEMIDE 10 MG/ML
20 INJECTION INTRAMUSCULAR; INTRAVENOUS DAILY
Status: DISCONTINUED | OUTPATIENT
Start: 2019-08-01 | End: 2019-08-02

## 2019-08-01 RX ORDER — CARVEDILOL 6.25 MG/1
6.25 TABLET ORAL 2 TIMES DAILY WITH MEALS
COMMUNITY
End: 2019-08-05 | Stop reason: HOSPADM

## 2019-08-01 RX ORDER — HYDROCODONE BITARTRATE AND ACETAMINOPHEN 7.5; 325 MG/1; MG/1
1 TABLET ORAL EVERY 4 HOURS PRN
Status: DISCONTINUED | OUTPATIENT
Start: 2019-08-01 | End: 2019-08-05 | Stop reason: HOSPADM

## 2019-08-01 RX ORDER — HYDROCODONE BITARTRATE AND ACETAMINOPHEN 7.5; 325 MG/1; MG/1
1 TABLET ORAL ONCE
Status: COMPLETED | OUTPATIENT
Start: 2019-08-01 | End: 2019-08-01

## 2019-08-01 RX ORDER — BUDESONIDE AND FORMOTEROL FUMARATE DIHYDRATE 80; 4.5 UG/1; UG/1
2 AEROSOL RESPIRATORY (INHALATION)
Status: ON HOLD | COMMUNITY
End: 2019-08-01

## 2019-08-01 RX ORDER — BUDESONIDE AND FORMOTEROL FUMARATE DIHYDRATE 160; 4.5 UG/1; UG/1
2 AEROSOL RESPIRATORY (INHALATION)
COMMUNITY

## 2019-08-01 RX ORDER — LISINOPRIL 5 MG/1
5 TABLET ORAL DAILY
COMMUNITY

## 2019-08-01 RX ORDER — METOPROLOL SUCCINATE 25 MG/1
25 TABLET, EXTENDED RELEASE ORAL
Status: DISCONTINUED | OUTPATIENT
Start: 2019-08-01 | End: 2019-08-02

## 2019-08-01 RX ORDER — DIGOXIN 125 MCG
125 TABLET ORAL
Status: DISCONTINUED | OUTPATIENT
Start: 2019-08-01 | End: 2019-08-03

## 2019-08-01 RX ADMIN — SODIUM CHLORIDE 1000 ML: 900 INJECTION, SOLUTION INTRAVENOUS at 01:20

## 2019-08-01 RX ADMIN — SODIUM CHLORIDE 1.5 ML: 9 INJECTION INTRAMUSCULAR; INTRAVENOUS; SUBCUTANEOUS at 09:30

## 2019-08-01 RX ADMIN — FUROSEMIDE 20 MG: 10 INJECTION, SOLUTION INTRAVENOUS at 12:18

## 2019-08-01 RX ADMIN — DIGOXIN 125 MCG: 125 TABLET ORAL at 20:07

## 2019-08-01 RX ADMIN — ASPIRIN 81 MG: 81 TABLET ORAL at 09:49

## 2019-08-01 RX ADMIN — SODIUM CHLORIDE, PRESERVATIVE FREE 10 ML: 5 INJECTION INTRAVENOUS at 12:19

## 2019-08-01 RX ADMIN — SODIUM CHLORIDE, PRESERVATIVE FREE 3 ML: 5 INJECTION INTRAVENOUS at 20:08

## 2019-08-01 RX ADMIN — IOPAMIDOL 90 ML: 755 INJECTION, SOLUTION INTRAVENOUS at 03:30

## 2019-08-01 RX ADMIN — METOPROLOL SUCCINATE 25 MG: 25 TABLET, EXTENDED RELEASE ORAL at 14:58

## 2019-08-01 RX ADMIN — HYDROCODONE BITARTRATE AND ACETAMINOPHEN 1 TABLET: 7.5; 325 TABLET ORAL at 03:08

## 2019-08-01 RX ADMIN — SODIUM CHLORIDE, PRESERVATIVE FREE 3 ML: 5 INJECTION INTRAVENOUS at 09:49

## 2019-08-01 RX ADMIN — RIVAROXABAN 20 MG: 10 TABLET, FILM COATED ORAL at 09:49

## 2019-08-01 NOTE — H&P
History and Physical  Keon Nielson MD  Hospitalist    Date of admission: 2019    Patient Care Team:  Jacklyn Ruelas MD as PCP - General (Internal Medicine)    Chief complaint generalized weakness    Subjective     Patient is a 63 y.o. male admitted for generalized weakness accompanied by worsening shortness of breath, lower extremity edema and palpitations.    He has stopped taking all his medications about a month ago - his symptoms are getting worse since.    History  Past Medical History:   Diagnosis Date   • BPH (benign prostatic hyperplasia)    • Chronic atrial fibrillation (CMS/HCC)    • Chronic systolic heart failure (CMS/HCC)    • GERD (gastroesophageal reflux disease)    • Hypercholesterolemia    • Hypertension    • Morbid obesity (CMS/HCC)    • Neuropathy    • Sleep apnea      Past Surgical History:   Procedure Laterality Date   • BACK SURGERY     • INGUINAL HERNIA REPAIR Left    • NECK SURGERY     • SHOULDER SURGERY Right    • UMBILICAL HERNIA REPAIR       Family History   Problem Relation Age of Onset   • Hypertension Father      Social History     Tobacco Use   • Smoking status: Former Smoker     Last attempt to quit: 1978     Years since quittin.6   • Smokeless tobacco: Never Used   Substance Use Topics   • Alcohol use: Yes   • Drug use: No     Medications Prior to Admission   Medication Sig Dispense Refill Last Dose   • diclofenac (VOLTAREN) 1 % gel gel Apply 4 g topically to the appropriate area as directed 4 (Four) Times a Day As Needed.   2019 at Unknown time   • docusate sodium (COLACE) 100 MG capsule Take 100 mg by mouth Daily As Needed for Constipation.   Past Week at Unknown time   • furosemide (LASIX) 40 MG tablet Take 40 mg by mouth Daily.   Past Week at Unknown time   • aspirin 81 MG EC tablet Take 81 mg by mouth Daily.   Taking   • carvedilol (COREG) 25 MG tablet Take 25 mg by mouth 2 (Two) Times a Day With Meals.   More than a month at Unknown time   • Cholecalciferol  (VITAMIN D3) 5000 units capsule capsule Take 5,000 Units by mouth Daily.   Taking   • diclofenac (VOLTAREN) 75 MG EC tablet Take 75 mg by mouth 2 (Two) Times a Day.   Taking   • lisinopril (PRINIVIL,ZESTRIL) 20 MG tablet Take 20 mg by mouth Daily.   More than a month at Unknown time   • potassium chloride (K-DUR,KLOR-CON) 10 MEQ CR tablet Take 10 mEq by mouth Daily.   More than a month at Unknown time   • Red Yeast Rice 600 MG tablet Take 1 tablet by mouth 2 (Two) Times a Day.   More than a month at Unknown time   • rivaroxaban (XARELTO) 20 MG tablet Take 20 mg by mouth Daily.   More than a month at Unknown time   • Zinc 50 MG tablet Take 1 tablet by mouth Daily.   More than a month at Unknown time     Allergies:  Patient has no known allergies.    Review of Systems  Review of Systems   Constitutional: Positive for fatigue. Negative for fever.   Respiratory: Positive for cough and shortness of breath. Negative for wheezing.    Cardiovascular: Negative for chest pain, palpitations and leg swelling.   Gastrointestinal: Negative for abdominal distention, abdominal pain, diarrhea, nausea and vomiting.   Genitourinary: Positive for dysuria, frequency and urgency. Negative for penile pain and testicular pain.   Musculoskeletal: Negative for arthralgias, back pain and gait problem.   Skin: Positive for pallor. Negative for color change and rash.   Neurological: Positive for weakness. Negative for syncope, facial asymmetry, speech difficulty and light-headedness.   Psychiatric/Behavioral: Negative for agitation, behavioral problems and confusion.   All other systems reviewed and are negative.      Objective     Vital Signs  Temp:  [98 °F (36.7 °C)] 98 °F (36.7 °C)  Heart Rate:  [105-131] 124  Resp:  [18] 18  BP: (117-130)/(74-91) 124/88    Physical Exam:  Physical Exam   Constitutional: He is oriented to person, place, and time. No distress.   Morbidly obese    HENT:   Head: Normocephalic and atraumatic.   Eyes: EOM are  normal. Pupils are equal, round, and reactive to light. No scleral icterus.   Neck: Normal range of motion. Neck supple. No tracheal deviation present. No thyromegaly present.   Cardiovascular: An irregular rhythm present. Tachycardia present.   Pulmonary/Chest: Effort normal and breath sounds normal. No stridor. No respiratory distress.   Abdominal: Soft. Bowel sounds are normal. He exhibits no distension. There is no tenderness. There is no guarding.   Musculoskeletal: Normal range of motion. He exhibits edema (2+ bilateral ankle edema). He exhibits no tenderness.   Neurological: He is alert and oriented to person, place, and time. No cranial nerve deficit. Coordination normal.   Skin: Skin is warm and dry. No rash noted. There is pallor.   Psychiatric: He has a normal mood and affect. His behavior is normal.   Vitals reviewed.      Results Review:   Lab Results (last 24 hours)     ** No results found for the last 24 hours. **          Imaging Results (last 24 hours)     ** No results found for the last 24 hours. **          Assessment/Plan       Chronic atrial fibrillation (CMS/HCC)    Acute on chronic systolic CHF (congestive heart failure) (CMS/HCC)    BPH (benign prostatic hyperplasia)    Acute UTI (urinary tract infection)    Hypertension    Morbid obesity (CMS/HCC)    Continue with the IV Cardizem drip for rate control, IV Lasix, obtain ECG and a cardiac Echo in AM. His BNP / Troponin values obtained at [facility] were within normal limit.    Repeat UA, start Proscar / Flomax as he seems to have significant urinary tract symptoms.    Keon Nielson MD  07/31/19  9:53 PM

## 2019-08-01 NOTE — NURSING NOTE
Dr. Nielson was given the paperwork for [facility] on his arrival to unit. Dr. Nielson took paperwork to review and did not return it.

## 2019-08-01 NOTE — PLAN OF CARE
Problem: Patient Care Overview  Goal: Plan of Care Review  Outcome: Ongoing (interventions implemented as appropriate)   08/01/19 1444   Coping/Psychosocial   Plan of Care Reviewed With patient   Plan of Care Review   Progress no change   OTHER   Outcome Summary patient deines chest pain       Problem: Fall Risk (Adult)  Goal: Identify Related Risk Factors and Signs and Symptoms  Outcome: Ongoing (interventions implemented as appropriate)    Goal: Absence of Fall  Outcome: Ongoing (interventions implemented as appropriate)      Problem: Pain, Chronic (Adult)  Goal: Acceptable Pain/Comfort Level and Functional Ability  Outcome: Ongoing (interventions implemented as appropriate)      Problem: Urinary Tract Infection (Adult)  Goal: Signs and Symptoms of Listed Potential Problems Will be Absent, Minimized or Managed (Urinary Tract Infection)  Outcome: Ongoing (interventions implemented as appropriate)      Problem: Arrhythmia/Dysrhythmia (Symptomatic) (Adult)  Goal: Signs and Symptoms of Listed Potential Problems Will be Absent, Minimized or Managed (Arrhythmia/Dysrhythmia)  Outcome: Ongoing (interventions implemented as appropriate)      Problem: Cardiac: Heart Failure (Adult)  Goal: Signs and Symptoms of Listed Potential Problems Will be Absent, Minimized or Managed (Cardiac: Heart Failure)  Outcome: Ongoing (interventions implemented as appropriate)

## 2019-08-01 NOTE — CONSULTS
[facility]    Referring Provider: Keon Nielson MD    Reason for Consultation: AF    Patient Care Team:  Jacklyn Ruelas MD as PCP - General (Internal Medicine)    Chief complaint: dyspnea    Subjective .     History of present illness:   Mr. Salas is a 63 year old patient who presented to [facility] with dyspnea, edema, and palpitations. He stopped his medications 1 months ago. He thought he was better. Medications he stopped included Lasix and Xarelto.   Mr. Salas is severely over weight with a BMI of 47. He wears a CPAP. He has followed with Dr. Hutchinson in the past. He did not keep his last 6 month follow up appointment 7/23/19.    He had a non-diagnostic Lexiscan last year that showed no reversible ischemia. He had an echo in the past that showed reduced EF. Nuclear showed improved ejection fraction at that time.   Concerning leg edema, he likely has HFpEF secondary to comorbidities, but most concerning is his sedentary lifestyle.       Review of Systems  Review of Systems   Constitutional: Negative for diaphoresis, fatigue, fever and unexpected weight change.   Respiratory: Positive for shortness of breath. Negative for cough, chest tightness and wheezing.    Cardiovascular: Positive for leg swelling. Negative for chest pain and palpitations.   Gastrointestinal: Negative for abdominal distention and blood in stool.   Genitourinary: Negative for hematuria.   Musculoskeletal: Negative for arthralgias and myalgias.   Skin: Negative for pallor and rash.   Neurological: Negative for dizziness, syncope and weakness.   Hematological: Does not bruise/bleed easily.   Psychiatric/Behavioral: Negative for confusion. The patient is not nervous/anxious.        History  Past Medical History:   Diagnosis Date   • BPH (benign prostatic hyperplasia)    • Chronic atrial fibrillation (CMS/HCC)    • Chronic systolic heart failure (CMS/HCC)    • GERD (gastroesophageal reflux disease)    • Hypercholesterolemia    •  Hypertension    • Morbid obesity (CMS/HCC)    • Neuropathy    • Sleep apnea    ,   Past Surgical History:   Procedure Laterality Date   • BACK SURGERY     • INGUINAL HERNIA REPAIR Left    • NECK SURGERY     • SHOULDER SURGERY Right    • UMBILICAL HERNIA REPAIR     ,   Family History   Problem Relation Age of Onset   • Hypertension Father    ,   Social History     Tobacco Use   • Smoking status: Former Smoker     Last attempt to quit:      Years since quittin.6   • Smokeless tobacco: Never Used   Substance Use Topics   • Alcohol use: Yes   • Drug use: No   ,   Medications Prior to Admission   Medication Sig Dispense Refill Last Dose   • budesonide-formoterol (SYMBICORT) 160-4.5 MCG/ACT inhaler Inhale 2 puffs 2 (Two) Times a Day.      • carvedilol (COREG) 6.25 MG tablet Take 6.25 mg by mouth 2 (Two) Times a Day With Meals.      • docusate sodium (COLACE) 100 MG capsule Take 100 mg by mouth Daily As Needed for Constipation.   Past Week at Unknown time   • furosemide (LASIX) 40 MG tablet Take 40 mg by mouth Daily.   Past Week at Unknown time   • lisinopril (PRINIVIL,ZESTRIL) 5 MG tablet Take 5 mg by mouth Daily.      • aspirin 81 MG EC tablet Take 81 mg by mouth Daily.   Taking   • Cholecalciferol (VITAMIN D3) 5000 units capsule capsule Take 5,000 Units by mouth Daily.   Taking   • diclofenac (VOLTAREN) 1 % gel gel Apply 4 g topically to the appropriate area as directed 4 (Four) Times a Day As Needed.   More than a month at Unknown time   • diclofenac (VOLTAREN) 75 MG EC tablet Take 75 mg by mouth 2 (Two) Times a Day.   More than a month at Unknown time   • potassium chloride (K-DUR,KLOR-CON) 10 MEQ CR tablet Take 10 mEq by mouth Daily.   More than a month at Unknown time   • Red Yeast Rice 600 MG tablet Take 1 tablet by mouth 2 (Two) Times a Day.   More than a month at Unknown time   • rivaroxaban (XARELTO) 20 MG tablet Take 20 mg by mouth Daily.   More than a month at Unknown time   • Zinc 50 MG tablet Take 1  "tablet by mouth Daily.   More than a month at Unknown time      ALLERGIES  Patient has no known allergies.    Objective     Vital Sign Min/Max for last 24 hours  Temp  Min: 97.5 °F (36.4 °C)  Max: 98 °F (36.7 °C)   BP  Min: 83/56  Max: 135/63   Pulse  Min: 68  Max: 131   Resp  Min: 18  Max: 18   SpO2  Min: 88 %  Max: 98 %   Flow (L/min)  Min: 2  Max: 2   Weight  Min: 149 kg (327 lb 12.8 oz)  Max: 150 kg (330 lb 12.8 oz)     Flowsheet Rows      First Filed Value   Admission Height  177.8 cm (70\") Documented at 07/31/2019 1843   Admission Weight  149 kg (327 lb 12.8 oz)  (Abnormal)  Documented at 07/31/2019 1903           Physical Exam:  Physical Exam   Constitutional: He is oriented to person, place, and time. He appears well-developed and well-nourished. No distress.   HENT:   Head: Normocephalic.   Eyes: Conjunctivae are normal.   Neck: No JVD present.   Cardiovascular: Normal rate, regular rhythm, S1 normal, S2 normal, normal heart sounds and intact distal pulses. Exam reveals no gallop and no friction rub.   No murmur heard.  Pulmonary/Chest: Effort normal and breath sounds normal. No respiratory distress. He has no wheezes. He has no rales.   Abdominal: Soft. Bowel sounds are normal. He exhibits no distension.   Musculoskeletal: Normal range of motion. He exhibits edema.   Neurological: He is alert and oriented to person, place, and time.   Skin: Skin is warm and dry. He is not diaphoretic. No erythema.   Psychiatric: He has a normal mood and affect. His behavior is normal. Judgment and thought content normal.   Nursing note and vitals reviewed.         Results Review:     Results from last 7 days   Lab Units 08/01/19  0718 08/01/19  0125   SODIUM mmol/L 138 138   POTASSIUM mmol/L 4.1 4.1   CHLORIDE mmol/L 103 101   CO2 mmol/L 23.0 24.0   BUN mg/dL 14 12   CREATININE mg/dL 0.93 1.02   CALCIUM mg/dL 8.9 9.0   GLUCOSE mg/dL 154* 153*       Estimated Creatinine Clearance: 121.1 mL/min (by C-G formula based on SCr " of 0.93 mg/dL).          Results from last 7 days   Lab Units 08/01/19  0125   WBC 10*3/mm3 16.10*   HEMOGLOBIN g/dL 13.2   PLATELETS 10*3/mm3 129*       Results from last 7 days   Lab Units 08/01/19  0152   INR  2.77*       Lab Results   Component Value Date    TROPONINT <0.010 08/01/2019     Lab Results   Component Value Date    PROBNP 1,000.0 (H) 08/01/2019       I/O last 3 completed shifts:  In: 1460 [P.O.:360; I.V.:1000; IV Piggyback:100]  Out: 1450 [Urine:1450]    Cardiographics        Interpretation Summary     · Non diagonostic lexiscan  · EF-69%,Fixed apical defect, No reversible ischemia          Xr Chest 1 View    Result Date: 8/1/2019  No acute disease. Electronically signed by:  Adama Urais  8/1/2019 2:33 AM CDT Workstation: [id number]    Us Venous Doppler Lower Extremity Bilateral (duplex)    Result Date: 8/1/2019  CONCLUSION: No ultrasound evidence of deep venous thrombosis of either lower extremity. 6.02 cm x 3.01 cm x 1.79 cm left popliteal or Baker's cyst with some internal debris or hemorrhage. [id number] Electronically signed by:  Jose Raul Stephenson MD  8/1/2019 9:40 AM CDT Workstation: [id number]    Ct Angiogram Chest With Contrast    Result Date: 8/1/2019  1. No evidence of pulmonary embolus. 2. No acute abnormality. Electronically signed by:  Adama Urias  8/1/2019 3:23 AM CDT Workstation: [id number]      Intake/Output       07/31/19 0700 - 08/01/19 0659 08/01/19 0700 - 08/02/19 0659    Intake (ml) 1460 240    Output (ml) 1450 275    Net (ml) 10 -35    Last Weight  149 kg (329 lb 6.4 oz)  150 kg (330 lb 12.8 oz)          Assessment/Plan       Chronic atrial fibrillation (CMS/HCC)  Paroxysmal atrial fibrillation chronic - 1 year or more  CHADSVASC: CHF, HTN and DM  EF is low. mild evidence for LVH. LA size is enlarged.    Rate is controlled at this time    Anticoagulation:ordered  - Xarelto 20mg daily     Rate control agents:ordered  - Toprol XL 25mg    Rhythm control agents:not indicated    HFpEF,  chronic  EF:65%. NYHA Class III, Stage C. Patient is currently volume overloaded.  and in a well perfused physiologic state. Hemodynamics are acceptable  BETA-BLOCKER:   Toprol XL 25mg (was on Coreg 6.25mg BID)  ACE/ARB: hypotension (was on 5mg Lisinopril)  ENTRESTO: not indicated  DIURETIC: Lasix 20mg IV daily. Was on 40mg PO daily  ALDOSTERONTE ANTAGONIST: Not currently indicated.  IMDUR/HYDRALAZINE: N/A  DIGOXIN: Low-dose for rate control and LV dysfunction  Fluid restriction: 2 L  Sodium restriction:2 grams         Leg edema  - BNP slightly elevated  - Lasix IV      I discussed the patients findings and my recommendations with patient, nursing staff and primary care team and Dr. Armas          This document has been electronically signed by CHANTEL Harirs on August 1, 2019 1:44 PM      Patient was seen and discussed the care, he is planning to move to [place] and used to see Dr. Hutchinson.  Plan right now is rate controlled and anticoagulation.    Andrés Armas MD

## 2019-08-01 NOTE — SIGNIFICANT NOTE
RN paged.   Pt was seen & evaluated at bedside    Pressure trend: 130/91 ----> 117/56 ---> 91/56 ---> 83/56. Map 64  137 bpm    Lung exam reveals no crackles bilaterally  B/l LE edema  Unable to determine if there is asymettry in the LE extremity edema. The RLE calf region is very tight. There is a small nodular area of skin over the right posterior calf. I am uncertain what this is. I am concerned for a DVT. I will order a RLE doppler. The patient states he has not taken his xarelto in the past month or so. I am concerned about the patient's falling pressures. I will hold his coreg, lasix, lisinopril. I will hold the cardizem gtt. I will bolus the patient 1 L fluids. Patient states he can't catch his breath. I am worried that there may be a pulmonary embolus. Wells scores 9.0-high pre-test probability ergo justifying the test. I reviewed the renal function prior to placing the order. The patient is also hypoxic. He is requiring 2 L O2 to maintain acceptable sats.   BMP reviewed renal function wnl  STAT CTA r/o PE  rle doppler  abg

## 2019-08-01 NOTE — CONSULTS
"Adult Nutrition  Assessment    Patient Name:  Arsh Salas  YOB: 1955  MRN: [id number]  Admit Date:  7/31/2019    Assessment Date:  8/1/2019    Comments:  BMI 47 with pt at 198% IBW.  Pt with dx Heart Failure.  Multiple unsuccessful attempts made to provide diet ed (pt received lunch, pt sleeping).  Will provide diet ed later.    Reason for Assessment     Row Name 08/01/19 1725          Reason for Assessment    Reason For Assessment  per organizational policy Wt Loss Low Sodium Diet ed     Diagnosis  cardiac disease;other (see comments) dx Morbid Obesity, dx Heart Failure     Identified At Risk by Screening Criteria  BMI;need for education           Anthropometrics     Row Name 08/01/19 1320 08/01/19 1309       Anthropometrics    Height  --  177.8 cm (70\")    Weight  150 kg (330 lb 12.8 oz)  (Abnormal)   --       Ideal Body Weight (IBW)    Ideal Body Weight (IBW) (kg)  --  76.48       IBW Adjustment, Para/Tetraplegia    5% Adjustment, Para (IBW)  --  72.66    10% Adjustment, Para (IBW)  --  68.83    10% Adjustment, Tetra (IBW)  --  68.83    15% Adjustment, Tetra (IBW)  --  65.01            Estimated/Assessed Needs     Row Name 08/01/19 1309          Calculation Measurements    Height  177.8 cm (70\")           Evaluation of Received Nutrient/Fluid Intake     Row Name 08/01/19 1309          Calculation Measurements    Height  177.8 cm (70\")         Evaluation of Prescribed Nutrient/Fluid Intake     Row Name 08/01/19 1309          Calculation Measurements    Height  177.8 cm (70\")             Electronically signed by:  Delia Hector RD  08/01/19 5:26 PM  "

## 2019-08-01 NOTE — PROGRESS NOTES
Progress Note  Keon Nielson MD  Hospitalist    Date of visit: 8/1/2019     LOS: 1 day   Patient Care Team:  Jacklyn Ruelas MD as PCP - General (Internal Medicine)    Chief Complaint: palpitations     Subjective     Interval History:     Patient Complaints: palpitations / shortness of breath and leg edema, improved.    History taken from: patient    Medication Review:   Current Facility-Administered Medications   Medication Dose Route Frequency Provider Last Rate Last Dose   • aspirin EC tablet 81 mg  81 mg Oral Daily Keon Nielson MD   81 mg at 08/01/19 0949   • finasteride (PROSCAR) tablet 5 mg  5 mg Oral Daily Keon Nielson MD   5 mg at 07/31/19 2248   • furosemide (LASIX) injection 20 mg  20 mg Intravenous Daily Keon Nielson MD   20 mg at 08/01/19 1218   • rivaroxaban (XARELTO) tablet 20 mg  20 mg Oral Daily Dilip Cronin MD   20 mg at 08/01/19 0949   • sodium chloride 0.9 % flush 3 mL  3 mL Intravenous Q12H Keon Nielson MD   3 mL at 08/01/19 0949   • sodium chloride 0.9 % flush 3-10 mL  3-10 mL Intravenous PRN Keon Nielson MD   10 mL at 08/01/19 1219       Review of Systems:   Review of Systems   Constitutional: Positive for fatigue. Negative for fever.   Respiratory: Positive for shortness of breath. Negative for cough, wheezing and stridor.    Cardiovascular: Positive for palpitations and leg swelling. Negative for chest pain.   Gastrointestinal: Negative for abdominal distention, blood in stool, diarrhea and nausea.   Genitourinary: Positive for dysuria, frequency and urgency. Negative for flank pain, hematuria and penile pain.   Musculoskeletal: Negative for arthralgias and back pain.   Skin: Positive for pallor. Negative for color change and rash.   Neurological: Positive for weakness. Negative for syncope, facial asymmetry, light-headedness and headaches.   Psychiatric/Behavioral: Negative for agitation, behavioral problems and confusion.   All other systems reviewed and are  negative.      Objective     Vital Signs  Temp:  [97.5 °F (36.4 °C)-98 °F (36.7 °C)] 97.5 °F (36.4 °C)  Heart Rate:  [] 72  Resp:  [18] 18  BP: ()/(51-91) 127/64    Physical Exam:  Physical Exam   Constitutional: He is oriented to person, place, and time. No distress.   Morbidly obese    HENT:   Head: Normocephalic and atraumatic.   Eyes: EOM are normal. Pupils are equal, round, and reactive to light. No scleral icterus.   Neck: Normal range of motion. Neck supple.   Cardiovascular: Normal rate and regular rhythm.   Pulmonary/Chest: Effort normal and breath sounds normal. No stridor. No respiratory distress.   Abdominal: Soft. Bowel sounds are normal. He exhibits no distension and no mass. There is no tenderness.   Musculoskeletal: He exhibits edema (1 to 2+). He exhibits no tenderness.   Neurological: He is alert and oriented to person, place, and time. He displays normal reflexes. No cranial nerve deficit. Coordination normal.   Skin: Skin is warm and dry. There is pallor.   Psychiatric: He has a normal mood and affect. His behavior is normal.   Vitals reviewed.       Results Review:    Lab Results (last 24 hours)     Procedure Component Value Units Date/Time    Troponin [[id number]] Collected:  08/01/19 1236    Specimen:  Blood Updated:  08/01/19 1243    Urinalysis With Culture If Indicated - Urine, Catheter [[id number]]  (Abnormal) Collected:  08/01/19 0909    Specimen:  Urine, Catheter Updated:  08/01/19 1018     Color, UA Dark Yellow     Appearance, UA Clear     pH, UA 5.5     Specific [place], UA 1.066     Comment: Result obtained by Refractometer        Glucose, UA Negative     Ketones, UA Negative     Bilirubin, UA Small (1+)     Blood, UA Large (3+)     Protein,  mg/dL (2+)     Leuk Esterase, UA Trace     Nitrite, UA Negative     Urobilinogen, UA 1.0 E.U./dL    Urinalysis, Microscopic Only - Urine, Catheter [[id number]]  (Abnormal) Collected:  08/01/19 0955    Specimen:  Urine, Catheter  Updated:  08/01/19 1015     RBC, UA Too Numerous to Count /HPF      WBC, UA 13-20 /HPF      Bacteria, UA None Seen /HPF      Squamous Epithelial Cells, UA 3-5 /HPF      Hyaline Casts, UA 13-20 /LPF      Methodology Automated Microscopy    Urine Culture - Urine, Urine, Catheter [[id number]] Collected:  08/01/19 0955    Specimen:  Urine, Catheter Updated:  08/01/19 1015    Troponin [[id number]]  (Normal) Collected:  08/01/19 0718    Specimen:  Blood Updated:  08/01/19 0807     Troponin T <0.010 ng/mL     Narrative:       Troponin T Reference Range:  <= 0.03 ng/mL-   Negative for AMI  >0.03 ng/mL-     Abnormal for myocardial necrosis.  Clinicians would have to utilize clinical acumen, EKG, Troponin and serial changes to determine if it is an Acute Myocardial Infarction or myocardial injury due to an underlying chronic condition.     Basic Metabolic Panel [[id number]]  (Abnormal) Collected:  08/01/19 0718    Specimen:  Blood Updated:  08/01/19 0805     Glucose 154 mg/dL      BUN 14 mg/dL      Creatinine 0.93 mg/dL      Sodium 138 mmol/L      Potassium 4.1 mmol/L      Chloride 103 mmol/L      CO2 23.0 mmol/L      Calcium 8.9 mg/dL      eGFR Non African Amer 82 mL/min/1.73      BUN/Creatinine Ratio 15.1     Anion Gap 12.0 mmol/L     Narrative:       GFR Normal >60  Chronic Kidney Disease <60  Kidney Failure <15    CBC & Differential [[id number]] Collected:  08/01/19 0125    Specimen:  Blood Updated:  08/01/19 0400    Narrative:       The following orders were created for panel order CBC & Differential.  Procedure                               Abnormality         Status                     ---------                               -----------         ------                     Scan Slide[[id number]]                                       Final result               CBC Auto Differential[[id number]]        Abnormal            Final result                 Please view results for these tests on the individual orders.    Scan Slide  [[id number]] Collected:  08/01/19 0125    Specimen:  Blood Updated:  08/01/19 0400     RBC Morphology Normal     WBC Morphology Normal     Platelet Estimate Decreased    Protime-INR [[id number]]  (Abnormal) Collected:  08/01/19 0152    Specimen:  Blood Updated:  08/01/19 0301     Protime 29.0 Seconds      INR 2.77    Narrative:       Therapeutic range for most indications is 2.0-3.0 INR,  or 2.5-3.5 for mechanical heart valves.    aPTT [[id number]]  (Normal) Collected:  08/01/19 0152    Specimen:  Blood Updated:  08/01/19 0300     PTT 39.3 seconds     Narrative:       The recommended Heparin therapeutic range is 68-97 seconds.    Basic Metabolic Panel [[id number]]  (Abnormal) Collected:  08/01/19 0125    Specimen:  Blood Updated:  08/01/19 0202     Glucose 153 mg/dL      BUN 12 mg/dL      Creatinine 1.02 mg/dL      Sodium 138 mmol/L      Potassium 4.1 mmol/L      Chloride 101 mmol/L      CO2 24.0 mmol/L      Calcium 9.0 mg/dL      eGFR Non African Amer 74 mL/min/1.73      BUN/Creatinine Ratio 11.8     Anion Gap 13.0 mmol/L     Narrative:       GFR Normal >60  Chronic Kidney Disease <60  Kidney Failure <15    Troponin [[id number]]  (Normal) Collected:  08/01/19 0125    Specimen:  Blood Updated:  08/01/19 0156     Troponin T <0.010 ng/mL     Narrative:       Troponin T Reference Range:  <= 0.03 ng/mL-   Negative for AMI  >0.03 ng/mL-     Abnormal for myocardial necrosis.  Clinicians would have to utilize clinical acumen, EKG, Troponin and serial changes to determine if it is an Acute Myocardial Infarction or myocardial injury due to an underlying chronic condition.     CBC Auto Differential [[id number]]  (Abnormal) Collected:  08/01/19 0125    Specimen:  Blood Updated:  08/01/19 0140     WBC 16.10 10*3/mm3      RBC 4.48 10*6/mm3      Hemoglobin 13.2 g/dL      Hematocrit 40.9 %      MCV 91.3 fL      MCH 29.5 pg      MCHC 32.3 g/dL      RDW 14.8 %      RDW-SD 49.3 fl      MPV 12.0 fL      Platelets 129 10*3/mm3       Neutrophil % 81.8 %      Lymphocyte % 8.6 %      Monocyte % 8.4 %      Eosinophil % 0.2 %      Basophil % 0.4 %      Immature Grans % 0.6 %      Neutrophils, Absolute 13.17 10*3/mm3      Lymphocytes, Absolute 1.38 10*3/mm3      Monocytes, Absolute 1.35 10*3/mm3      Eosinophils, Absolute 0.04 10*3/mm3      Basophils, Absolute 0.06 10*3/mm3      Immature Grans, Absolute 0.10 10*3/mm3      nRBC 0.0 /100 WBC     Blood Gas, Arterial [[id number]] Collected:  08/01/19 0128    Specimen:  Arterial Blood Updated:  08/01/19 0134     Site Left Radial     Mando's Test Positive     pH, Arterial 7.445 pH units      pCO2, Arterial 35.6 mm Hg      pO2, Arterial 87.9 mm Hg      HCO3, Arterial 24.4 mmol/L      Base Excess, Arterial 0.7 mmol/L      O2 Saturation, Arterial 97.3 %      Barometric Pressure for Blood Gas 749 mmHg      Modality Nasal Cannula     Flow Rate 2.0 lpm      Ventilator Mode NA     Collected by TS     Comment: Meter: [id number]     :  [id number]             Imaging Results (last 24 hours)     Procedure Component Value Units Date/Time    US Venous Doppler Lower Extremity Bilateral (duplex) [[id number]] Collected:  08/01/19 0836     Updated:  08/01/19 0941    Narrative:         Ultrasound venous duplex bilateral lower extremities    HISTORY: Shortness of breath. Lower extremity pain.    Duplex ultrasound of the deep venous system of each lower  extremity was performed    FINDINGS:  Real-time images demonstrate normal compressibility without  evidence of intraluminal thrombus.  Doppler shows phasic flow and augmentation.  Color Doppler also reveals venous patency.    6.02 cm x 3.01 cm x 1.79 cm left popliteal or Baker's cyst with  some internal debris or hemorrhage.      Impression:       CONCLUSION:  No ultrasound evidence of deep venous thrombosis of either lower  extremity.  6.02 cm x 3.01 cm x 1.79 cm left popliteal or Baker's cyst with  some internal debris or hemorrhage.    [id number]    Electronically  signed by:  Jose Raul Stephenson MD  8/1/2019 9:40 AM CDT  Workstation: [id number]    CT Angiogram Chest With Contrast [[id number]] Collected:  08/01/19 0233     Updated:  08/01/19 0324    Narrative:         CT angiogram chest with contrast on 8/1/2019     CLINICAL INDICATION: Shortness of breath    TECHNIQUE: Multiple axial images are obtained throughout the  chest following the administration of IV contrast.  Computer  generated 3D reconstructions/MIPS were performed. This exam was  performed according to our departmental dose-optimization  program, which includes automated exposure control, adjustment of  the mA and/or kV according to patient size and/or use of  iterative reconstruction technique.   Total DLP is 73086.9 mGy*cm.    COMPARISON: None     FINDINGS: There is mild elevation of the right hemidiaphragm.  There is no pleural or pericardial effusion. Limited visualized  upper abdomen is unremarkable. Breathing motion limits some of  the examination. There are no filling defects within the  pulmonary arteries to suggest pulmonary embolus. There is no  thoracic adenopathy. There is minimal bilateral dependent  atelectasis. Lungs are otherwise clear. Degenerative changes are  noted in the spine.      Impression:       1. No evidence of pulmonary embolus.  2. No acute abnormality.    Electronically signed by:  Adama Urias  8/1/2019 3:23 AM CDT  Workstation: [id number]    XR Chest 1 View [[id number]] Collected:  08/01/19 0153     Updated:  08/01/19 0234    Narrative:         Chest single view on  8/1/2019     CLINICAL INDICATION: Shortness of breath    COMPARISON: None    FINDINGS: There is elevation of the right hemidiaphragm. Mild  cardiomegaly is noted. The lungs are clear. Hilar and mediastinal  contours are within normal limits. Pulmonary vascularity is  within normal limits.      Impression:       No acute disease.    Electronically signed by:  Adama Urias  8/1/2019 2:33 AM CDT  Workstation: [id number]           Assessment/Plan       Chronic atrial fibrillation (CMS/HCC)    BPH (benign prostatic hyperplasia)    Hematuria    Leg edema    Hypertension    Morbid obesity (CMS/HCC)    Obstructive sleep apnea    Follow up on the cardiac Echo, continue with the IV Lasix, anti coagulation. His rate was controlled initially with IV Cardizem, remains at 75 to 90/min even after the drip was discontinued. Consult Cardiology.    His urinary tract symptoms were relieved to a great extent by placing a [place] catheter - his UA shows hematuria (possible due to the anti coagulation - INR 2.77, trauma, BPH). He is started on Proscar / Flomax.    Can continue to use his own home C.PAP unit.    Keon Nielson MD  08/01/19  12:43 PM

## 2019-08-01 NOTE — PLAN OF CARE
Problem: Patient Care Overview  Goal: Plan of Care Review  Outcome: Ongoing (interventions implemented as appropriate)  Multiple unsuccessful attempts made this date to provide diet ed (pt received lunch, pt sleeping).  Will provide diet ed as appropriate.   08/01/19 4871   Coping/Psychosocial   Plan of Care Reviewed With (Unable to provide ed due to pt receiving lunch, pt resting.)   Plan of Care Review   Progress no change   OTHER   Outcome Summary initial visit

## 2019-08-01 NOTE — NURSING NOTE
MD Dr. Cronin called in regards to patient's c/o of chest tightness and right leg cramping pain. Pt has no orders at this time for pain medications. MD also informed of pt's BP currently 91/56 and HR controlled rate of  74 in afib. Pt was ordered cardizem gtt and has been on hold for an hour. MD at this time placed orders to stop bp medicatons, stat labs ordered, Norco P.O ordered, 1L bolus, stop lasix. Primary RN questioned MD about 1 L bolus due to pt being admitted with fluid overload confirmed with chest xray at [facility] and requirement of lasix earlier in shift. MD states at this time that he will come to evaluate the patient in person.

## 2019-08-01 NOTE — PLAN OF CARE
Problem: Patient Care Overview  Goal: Plan of Care Review  Outcome: Ongoing (interventions implemented as appropriate)    Goal: Individualization and Mutuality  Outcome: Ongoing (interventions implemented as appropriate)    Goal: Discharge Needs Assessment  Outcome: Ongoing (interventions implemented as appropriate)      Problem: Fall Risk (Adult)  Goal: Identify Related Risk Factors and Signs and Symptoms  Outcome: Ongoing (interventions implemented as appropriate)    Goal: Absence of Fall  Outcome: Ongoing (interventions implemented as appropriate)      Problem: Pain, Chronic (Adult)  Goal: Identify Related Risk Factors and Signs and Symptoms  Outcome: Ongoing (interventions implemented as appropriate)    Goal: Acceptable Pain/Comfort Level and Functional Ability  Outcome: Ongoing (interventions implemented as appropriate)      Problem: Urinary Tract Infection (Adult)  Goal: Signs and Symptoms of Listed Potential Problems Will be Absent, Minimized or Managed (Urinary Tract Infection)  Outcome: Ongoing (interventions implemented as appropriate)      Problem: Arrhythmia/Dysrhythmia (Symptomatic) (Adult)  Goal: Signs and Symptoms of Listed Potential Problems Will be Absent, Minimized or Managed (Arrhythmia/Dysrhythmia)  Outcome: Ongoing (interventions implemented as appropriate)

## 2019-08-01 NOTE — PLAN OF CARE
Problem: Patient Care Overview  Goal: Plan of Care Review  Outcome: Ongoing (interventions implemented as appropriate)   08/01/19 0611   Plan of Care Review   Progress no change   OTHER   Outcome Summary CPAP on SB throughout the night due to suspected PE's per Dr. Cronin.

## 2019-08-02 LAB
ALBUMIN SERPL-MCNC: 3.8 G/DL (ref 3.5–5.2)
ALBUMIN/GLOB SERPL: 1.2 G/DL
ALP SERPL-CCNC: 56 U/L (ref 39–117)
ALT SERPL W P-5'-P-CCNC: 18 U/L (ref 1–41)
ANION GAP SERPL CALCULATED.3IONS-SCNC: 11 MMOL/L (ref 5–15)
AST SERPL-CCNC: 14 U/L (ref 1–40)
BACTERIA SPEC AEROBE CULT: NO GROWTH
BACTERIA UR QL AUTO: ABNORMAL /HPF
BASOPHILS # BLD AUTO: 0.04 10*3/MM3 (ref 0–0.2)
BASOPHILS NFR BLD AUTO: 0.4 % (ref 0–1.5)
BILIRUB SERPL-MCNC: 0.8 MG/DL (ref 0.2–1.2)
BILIRUB UR QL STRIP: NEGATIVE
BUN BLD-MCNC: 16 MG/DL (ref 8–23)
BUN/CREAT SERPL: 18 (ref 7–25)
CALCIUM SPEC-SCNC: 9.1 MG/DL (ref 8.6–10.5)
CHLORIDE SERPL-SCNC: 100 MMOL/L (ref 98–107)
CLARITY UR: CLEAR
CO2 SERPL-SCNC: 28 MMOL/L (ref 22–29)
COLOR UR: YELLOW
CREAT BLD-MCNC: 0.89 MG/DL (ref 0.76–1.27)
DEPRECATED RDW RBC AUTO: 49.1 FL (ref 37–54)
EOSINOPHIL # BLD AUTO: 0.11 10*3/MM3 (ref 0–0.4)
EOSINOPHIL NFR BLD AUTO: 1.2 % (ref 0.3–6.2)
ERYTHROCYTE [DISTWIDTH] IN BLOOD BY AUTOMATED COUNT: 14.6 % (ref 12.3–15.4)
GFR SERPL CREATININE-BSD FRML MDRD: 87 ML/MIN/1.73
GLOBULIN UR ELPH-MCNC: 3.2 GM/DL
GLUCOSE BLD-MCNC: 125 MG/DL (ref 65–99)
GLUCOSE UR STRIP-MCNC: NEGATIVE MG/DL
HCT VFR BLD AUTO: 44.1 % (ref 37.5–51)
HGB BLD-MCNC: 14.1 G/DL (ref 13–17.7)
HGB UR QL STRIP.AUTO: ABNORMAL
HYALINE CASTS UR QL AUTO: ABNORMAL /LPF
IMM GRANULOCYTES # BLD AUTO: 0.04 10*3/MM3 (ref 0–0.05)
IMM GRANULOCYTES NFR BLD AUTO: 0.4 % (ref 0–0.5)
INR PPP: 2.21 (ref 0.8–1.2)
KETONES UR QL STRIP: NEGATIVE
LEUKOCYTE ESTERASE UR QL STRIP.AUTO: ABNORMAL
LYMPHOCYTES # BLD AUTO: 0.81 10*3/MM3 (ref 0.7–3.1)
LYMPHOCYTES NFR BLD AUTO: 9 % (ref 19.6–45.3)
MCH RBC QN AUTO: 29.3 PG (ref 26.6–33)
MCHC RBC AUTO-ENTMCNC: 32 G/DL (ref 31.5–35.7)
MCV RBC AUTO: 91.7 FL (ref 79–97)
MONOCYTES # BLD AUTO: 0.55 10*3/MM3 (ref 0.1–0.9)
MONOCYTES NFR BLD AUTO: 6.1 % (ref 5–12)
NEUTROPHILS # BLD AUTO: 7.44 10*3/MM3 (ref 1.7–7)
NEUTROPHILS NFR BLD AUTO: 82.9 % (ref 42.7–76)
NITRITE UR QL STRIP: NEGATIVE
NRBC BLD AUTO-RTO: 0 /100 WBC (ref 0–0.2)
PH UR STRIP.AUTO: 6 [PH] (ref 5–9)
PLATELET # BLD AUTO: 168 10*3/MM3 (ref 140–450)
PMV BLD AUTO: 11.3 FL (ref 6–12)
POTASSIUM BLD-SCNC: 3.6 MMOL/L (ref 3.5–5.2)
PROT SERPL-MCNC: 7 G/DL (ref 6–8.5)
PROT UR QL STRIP: NEGATIVE
PROTHROMBIN TIME: 24.3 SECONDS (ref 11.1–15.3)
RBC # BLD AUTO: 4.81 10*6/MM3 (ref 4.14–5.8)
RBC # UR: ABNORMAL /HPF
REF LAB TEST METHOD: ABNORMAL
SODIUM BLD-SCNC: 139 MMOL/L (ref 136–145)
SP GR UR STRIP: 1.01 (ref 1–1.03)
SQUAMOUS #/AREA URNS HPF: ABNORMAL /HPF
UROBILINOGEN UR QL STRIP: ABNORMAL
WBC NRBC COR # BLD: 8.99 10*3/MM3 (ref 3.4–10.8)
WBC UR QL AUTO: ABNORMAL /HPF

## 2019-08-02 PROCEDURE — 85025 COMPLETE CBC W/AUTO DIFF WBC: CPT | Performed by: PHYSICIAN ASSISTANT

## 2019-08-02 PROCEDURE — 94760 N-INVAS EAR/PLS OXIMETRY 1: CPT

## 2019-08-02 PROCEDURE — 25010000002 DIGOXIN PER 500 MCG: Performed by: NURSE PRACTITIONER

## 2019-08-02 PROCEDURE — 85610 PROTHROMBIN TIME: CPT | Performed by: PHYSICIAN ASSISTANT

## 2019-08-02 PROCEDURE — 80053 COMPREHEN METABOLIC PANEL: CPT | Performed by: PHYSICIAN ASSISTANT

## 2019-08-02 PROCEDURE — 25010000002 CEFTRIAXONE PER 250 MG: Performed by: PHYSICIAN ASSISTANT

## 2019-08-02 PROCEDURE — 99232 SBSQ HOSP IP/OBS MODERATE 35: CPT | Performed by: NURSE PRACTITIONER

## 2019-08-02 PROCEDURE — 25010000002 FUROSEMIDE PER 20 MG: Performed by: PHYSICIAN ASSISTANT

## 2019-08-02 PROCEDURE — 81001 URINALYSIS AUTO W/SCOPE: CPT | Performed by: PHYSICIAN ASSISTANT

## 2019-08-02 PROCEDURE — 25010000002 FUROSEMIDE PER 20 MG: Performed by: NURSE PRACTITIONER

## 2019-08-02 PROCEDURE — 94799 UNLISTED PULMONARY SVC/PX: CPT

## 2019-08-02 RX ORDER — FUROSEMIDE 10 MG/ML
40 INJECTION INTRAMUSCULAR; INTRAVENOUS DAILY
Status: DISCONTINUED | OUTPATIENT
Start: 2019-08-02 | End: 2019-08-02

## 2019-08-02 RX ORDER — METOPROLOL SUCCINATE 50 MG/1
50 TABLET, EXTENDED RELEASE ORAL
Status: DISCONTINUED | OUTPATIENT
Start: 2019-08-02 | End: 2019-08-05

## 2019-08-02 RX ORDER — DIGOXIN 0.25 MG/ML
250 INJECTION INTRAMUSCULAR; INTRAVENOUS ONCE
Status: COMPLETED | OUTPATIENT
Start: 2019-08-02 | End: 2019-08-02

## 2019-08-02 RX ORDER — LISINOPRIL 5 MG/1
5 TABLET ORAL
Status: DISCONTINUED | OUTPATIENT
Start: 2019-08-02 | End: 2019-08-05 | Stop reason: HOSPADM

## 2019-08-02 RX ORDER — DIGOXIN 0.25 MG/ML
250 INJECTION INTRAMUSCULAR; INTRAVENOUS
Status: DISCONTINUED | OUTPATIENT
Start: 2019-08-02 | End: 2019-08-03

## 2019-08-02 RX ORDER — FUROSEMIDE 10 MG/ML
40 INJECTION INTRAMUSCULAR; INTRAVENOUS EVERY 12 HOURS
Status: DISCONTINUED | OUTPATIENT
Start: 2019-08-02 | End: 2019-08-05

## 2019-08-02 RX ADMIN — DIGOXIN 250 MCG: 0.25 INJECTION INTRAMUSCULAR; INTRAVENOUS at 16:02

## 2019-08-02 RX ADMIN — RIVAROXABAN 20 MG: 10 TABLET, FILM COATED ORAL at 08:23

## 2019-08-02 RX ADMIN — DIGOXIN 125 MCG: 125 TABLET ORAL at 12:16

## 2019-08-02 RX ADMIN — FINASTERIDE 5 MG: 5 TABLET, FILM COATED ORAL at 08:23

## 2019-08-02 RX ADMIN — FUROSEMIDE 40 MG: 10 INJECTION, SOLUTION INTRAMUSCULAR; INTRAVENOUS at 08:23

## 2019-08-02 RX ADMIN — CEFTRIAXONE SODIUM 1 G: 1 INJECTION, POWDER, FOR SOLUTION INTRAMUSCULAR; INTRAVENOUS at 12:16

## 2019-08-02 RX ADMIN — METOPROLOL SUCCINATE 50 MG: 50 TABLET, EXTENDED RELEASE ORAL at 08:23

## 2019-08-02 RX ADMIN — FUROSEMIDE 40 MG: 10 INJECTION, SOLUTION INTRAMUSCULAR; INTRAVENOUS at 21:01

## 2019-08-02 RX ADMIN — DIGOXIN 250 MCG: 0.25 INJECTION INTRAMUSCULAR; INTRAVENOUS at 13:29

## 2019-08-02 RX ADMIN — SODIUM CHLORIDE, PRESERVATIVE FREE 3 ML: 5 INJECTION INTRAVENOUS at 21:03

## 2019-08-02 RX ADMIN — LISINOPRIL 5 MG: 5 TABLET ORAL at 12:16

## 2019-08-02 NOTE — PLAN OF CARE
Problem: Patient Care Overview  Goal: Plan of Care Review  Outcome: Ongoing (interventions implemented as appropriate)   08/02/19 0532   Coping/Psychosocial   Plan of Care Reviewed With patient   Plan of Care Review   Progress no change       Problem: Fall Risk (Adult)  Goal: Identify Related Risk Factors and Signs and Symptoms  Outcome: Ongoing (interventions implemented as appropriate)    Goal: Absence of Fall  Outcome: Ongoing (interventions implemented as appropriate)      Problem: Pain, Chronic (Adult)  Goal: Identify Related Risk Factors and Signs and Symptoms  Outcome: Ongoing (interventions implemented as appropriate)    Goal: Acceptable Pain/Comfort Level and Functional Ability  Outcome: Ongoing (interventions implemented as appropriate)      Problem: Urinary Tract Infection (Adult)  Goal: Signs and Symptoms of Listed Potential Problems Will be Absent, Minimized or Managed (Urinary Tract Infection)  Outcome: Ongoing (interventions implemented as appropriate)      Problem: Arrhythmia/Dysrhythmia (Symptomatic) (Adult)  Goal: Signs and Symptoms of Listed Potential Problems Will be Absent, Minimized or Managed (Arrhythmia/Dysrhythmia)  Outcome: Ongoing (interventions implemented as appropriate)      Problem: Cardiac: Heart Failure (Adult)  Goal: Signs and Symptoms of Listed Potential Problems Will be Absent, Minimized or Managed (Cardiac: Heart Failure)  Outcome: Ongoing (interventions implemented as appropriate)

## 2019-08-02 NOTE — PROGRESS NOTES
"[facility] Progress Note   LOS: 2 days   Patient Care Team:  Jacklyn Ruelas MD as PCP - General (Internal Medicine)    Chief Complaint: dyspnea     Subjective     Interval History:   Patient Denies: shortness of air, chest pain, orthopnea and syncope  Patient Complaints: edema  History taken from: patient    Adjusting rate control medications. Doing well. Mendosa in place.     Objective     Vital Sign Min/Max for last 24 hours  Temp  Min: 97.2 °F (36.2 °C)  Max: 97.9 °F (36.6 °C)   BP  Min: 96/59  Max: 134/78   Pulse  Min: 72  Max: 123   Resp  Min: 18  Max: 20   SpO2  Min: 93 %  Max: 97 %   Flow (L/min)  Min: 1.5  Max: 2   Weight  Min: 149 kg (328 lb 12.8 oz)  Max: 150 kg (330 lb 12.8 oz)     Flowsheet Rows      First Filed Value   Admission Height  177.8 cm (70\") Documented at 07/31/2019 1843   Admission Weight  149 kg (327 lb 12.8 oz)  (Abnormal)  Documented at 07/31/2019 1903            08/01/19  0633 08/01/19  1320 08/02/19  0558   Weight: (!) 149 kg (329 lb 6.4 oz) (!) 150 kg (330 lb 12.8 oz) (!) 149 kg (328 lb 12.8 oz)       Physical Exam:  Physical Exam   Constitutional: He is oriented to person, place, and time. No distress.   Morbidly obese    HENT:   Head: Normocephalic and atraumatic.   Eyes: EOM are normal. Pupils are equal, round, and reactive to light. No scleral icterus.   Neck: Normal range of motion. Neck supple.   Cardiovascular: Normal rate and regular rhythm.   Pulmonary/Chest: Effort normal and breath sounds normal. No stridor. No respiratory distress.   Abdominal: Soft. Bowel sounds are normal. He exhibits no distension and no mass. There is no tenderness.   Musculoskeletal: He exhibits edema (1 to 2+). He exhibits no tenderness.   Neurological: He is alert and oriented to person, place, and time. He displays normal reflexes. No cranial nerve deficit. Coordination normal.   Skin: Skin is warm and dry. There is pallor.   Psychiatric: He has a normal mood and affect. His behavior is normal. "   Vitals reviewed.       Results Review:     Results from last 7 days   Lab Units 08/01/19  0718 08/01/19  0125   SODIUM mmol/L 138 138   POTASSIUM mmol/L 4.1 4.1   CHLORIDE mmol/L 103 101   CO2 mmol/L 23.0 24.0   BUN mg/dL 14 12   CREATININE mg/dL 0.93 1.02   CALCIUM mg/dL 8.9 9.0   GLUCOSE mg/dL 154* 153*     Estimated Creatinine Clearance: 120 mL/min (by C-G formula based on SCr of 0.93 mg/dL).      Results from last 7 days   Lab Units 08/01/19  0125   WBC 10*3/mm3 16.10*   HEMOGLOBIN g/dL 13.2   PLATELETS 10*3/mm3 129*       Results from last 7 days   Lab Units 08/01/19  0152   INR  2.77*     Lab Results   Component Value Date    PROBNP 1,000.0 (H) 08/01/2019       I/O last 3 completed shifts:  In: 2180 [P.O.:1080; I.V.:1000; IV Piggyback:100]  Out: 3625 [Urine:3625]    Cardiographics:    Results for orders placed during the hospital encounter of 07/31/19   Adult Transthoracic Echo Complete W/ Cont if Necessary Per Protocol    Narrative · The study is technically difficult for diagnosis.  · Definity contrast in order to optimize the study  · The left ventricular cavity is moderately dilated.  · Left ventricular wall thickness is consistent with mild concentric   hypertrophy.  · Right ventricular cavity is mild-to-moderately dilated.  · Mildly reduced right ventricular systolic function noted.  · Mild tricuspid valve regurgitation is present.  · Left atrial cavity size is moderate-to-severely dilated.  · Estimated EF appears to be in the range of 41 - 45%.          Xr Chest 1 View    Result Date: 8/1/2019  No acute disease. Electronically signed by:  Adama Urias  8/1/2019 2:33 AM CDT Workstation: [id number]    Us Venous Doppler Lower Extremity Bilateral (duplex)    Result Date: 8/1/2019  CONCLUSION: No ultrasound evidence of deep venous thrombosis of either lower extremity. 6.02 cm x 3.01 cm x 1.79 cm left popliteal or Baker's cyst with some internal debris or hemorrhage. [id number] Electronically signed by:  Jose Raul  Sachin URBINA  8/1/2019 9:40 AM CDT Workstation: [id number]    Ct Angiogram Chest With Contrast    Result Date: 8/1/2019  1. No evidence of pulmonary embolus. 2. No acute abnormality. Electronically signed by:  Adama Urias  8/1/2019 3:23 AM CDT Workstation: [id number]      Medication Review:     Current Facility-Administered Medications:   •  cefTRIAXone (ROCEPHIN) 1 g/100 mL 0.9% NS (MBP), 1 g, Intravenous, Q24H, Darshan Kennedy PA  •  digoxin (LANOXIN) tablet 125 mcg, 125 mcg, Oral, Daily, Andrés Armas MD, 125 mcg at 08/01/19 2007  •  finasteride (PROSCAR) tablet 5 mg, 5 mg, Oral, Daily, Keon Nielson MD, 5 mg at 08/02/19 0823  •  furosemide (LASIX) injection 40 mg, 40 mg, Intravenous, Q12H, Darshan Kennedy PA  •  HYDROcodone-acetaminophen (NORCO) 7.5-325 MG per tablet 1 tablet, 1 tablet, Oral, Q4H PRN, Keon Nielson MD  •  metoprolol succinate XL (TOPROL-XL) 24 hr tablet 50 mg, 50 mg, Oral, Q24H, Sydney Blunt APRN, 50 mg at 08/02/19 0823  •  rivaroxaban (XARELTO) tablet 20 mg, 20 mg, Oral, Daily, Dilip Cronin MD, 20 mg at 08/02/19 0823  •  sodium chloride 0.9 % flush 3 mL, 3 mL, Intravenous, Q12H, Keon Nielson MD, 3 mL at 08/01/19 2008  •  sodium chloride 0.9 % flush 3-10 mL, 3-10 mL, Intravenous, PRN, Keon Nielson MD, 10 mL at 08/01/19 1219    Assessment/Plan       Chronic atrial fibrillation (CMS/HCC)    Hypertension    Morbid obesity (CMS/HCC)    BPH (benign prostatic hyperplasia)    Obstructive sleep apnea    Hematuria    Leg edema    Chronic atrial fibrillation (CMS/HCC)  Paroxysmal atrial fibrillation chronic - 1 year or more  CHADSVASC: CHF, HTN and DM  EF is 41-45. mild evidence for LVH. LA size is enlarged.    Rate is fairly controlled at this time. Increasing BB, added Dig     Anticoagulation:ordered  - Xarelto 20mg daily      Rate control agents:ordered  - Toprol XL 25mg- increase to 50mg  - Digoxin 125mcg daily  - ADD Digoxin IV 250mcg now and in 2 hours    Rhythm control  agents:not indicated     HFpEF, chronic  EF:41-45%. NYHA Class III, Stage C. Patient is currently  Mildly volume overloaded.  and in a well perfused physiologic state. Hemodynamics are acceptable  BETA-BLOCKER:   Toprol XL 50mg (was on Coreg 6.25mg BID)  ACE/ARB: start back 5mg Lisinopril   ENTRESTO: not indicated  DIURETIC: Lasix 40mg IV BID Was on 40mg PO daily at home.   ALDOSTERONTE ANTAGONIST: Not currently indicated.  IMDUR/HYDRALAZINE: N/A  DIGOXIN: added 125mcg   Fluid restriction: 2 L  Sodium restriction:2 grams          Leg edema  - BNP slightly elevated  - Lasix IV, diuresing well.       Plan for disposition:Where: home and When:  1-2days          This document has been electronically signed by CHANTEL Harris on August 2, 2019 11:29 AM

## 2019-08-02 NOTE — PLAN OF CARE
Problem: Patient Care Overview  Goal: Plan of Care Review  Outcome: Ongoing (interventions implemented as appropriate)    Goal: Individualization and Mutuality  Outcome: Ongoing (interventions implemented as appropriate)      Problem: Fall Risk (Adult)  Goal: Identify Related Risk Factors and Signs and Symptoms  Outcome: Ongoing (interventions implemented as appropriate)    Goal: Absence of Fall  Outcome: Ongoing (interventions implemented as appropriate)      Problem: Pain, Chronic (Adult)  Goal: Identify Related Risk Factors and Signs and Symptoms  Outcome: Ongoing (interventions implemented as appropriate)    Goal: Acceptable Pain/Comfort Level and Functional Ability  Outcome: Ongoing (interventions implemented as appropriate)      Problem: Urinary Tract Infection (Adult)  Goal: Signs and Symptoms of Listed Potential Problems Will be Absent, Minimized or Managed (Urinary Tract Infection)  Outcome: Ongoing (interventions implemented as appropriate)      Problem: Arrhythmia/Dysrhythmia (Symptomatic) (Adult)  Goal: Signs and Symptoms of Listed Potential Problems Will be Absent, Minimized or Managed (Arrhythmia/Dysrhythmia)  Outcome: Ongoing (interventions implemented as appropriate)      Problem: Cardiac: Heart Failure (Adult)  Goal: Signs and Symptoms of Listed Potential Problems Will be Absent, Minimized or Managed (Cardiac: Heart Failure)  Outcome: Ongoing (interventions implemented as appropriate)

## 2019-08-02 NOTE — CONSULTS
Referring Provider: Marcia  Reason for Consultation: Urinary tract infection urinary retention hematuria    Patient Care Team:  Jacklyn Ruelas MD as PCP - General (Internal Medicine)    Chief complaint: Urinary retention dysuria    Subjective .     History of present illness: White male patient comes in with atrial fibrillation also urinary tract infection dysuria urinary retention hematuria is unknown history of BPH is also had necrotizing fasciitis affecting his scrotum and was treated by Dr. Hernandez in [place] in .  His present state of affairs includes starting off with some dysuria he subsequently thinks that caused his other condition to exacerbate    Review of Systems:  Pertinent items are noted in HPI    History:  Past Medical History:   Diagnosis Date   • BPH (benign prostatic hyperplasia)    • Chronic atrial fibrillation (CMS/HCC)    • Chronic systolic heart failure (CMS/HCC)    • GERD (gastroesophageal reflux disease)    • Hypercholesterolemia    • Hypertension    • Morbid obesity (CMS/HCC)    • Neuropathy    • Sleep apnea    ,   Past Surgical History:   Procedure Laterality Date   • BACK SURGERY     • INGUINAL HERNIA REPAIR Left    • NECK SURGERY     • SHOULDER SURGERY Right    • UMBILICAL HERNIA REPAIR     ,   Family History   Problem Relation Age of Onset   • Hypertension Father    ,   Social History     Tobacco Use   • Smoking status: Former Smoker     Last attempt to quit: 1978     Years since quittin.6   • Smokeless tobacco: Never Used   Substance Use Topics   • Alcohol use: Yes   • Drug use: No   ,   Medications Prior to Admission   Medication Sig Dispense Refill Last Dose   • budesonide-formoterol (SYMBICORT) 160-4.5 MCG/ACT inhaler Inhale 2 puffs 2 (Two) Times a Day.      • carvedilol (COREG) 6.25 MG tablet Take 6.25 mg by mouth 2 (Two) Times a Day With Meals.      • docusate sodium (COLACE) 100 MG capsule Take 100 mg by mouth Daily As Needed for Constipation.   Past Week at  Unknown time   • furosemide (LASIX) 40 MG tablet Take 40 mg by mouth Daily.   Past Week at Unknown time   • lisinopril (PRINIVIL,ZESTRIL) 5 MG tablet Take 5 mg by mouth Daily.      • aspirin 81 MG EC tablet Take 81 mg by mouth Daily.   Taking   • Cholecalciferol (VITAMIN D3) 5000 units capsule capsule Take 5,000 Units by mouth Daily.   Taking   • diclofenac (VOLTAREN) 1 % gel gel Apply 4 g topically to the appropriate area as directed 4 (Four) Times a Day As Needed.   More than a month at Unknown time   • diclofenac (VOLTAREN) 75 MG EC tablet Take 75 mg by mouth 2 (Two) Times a Day.   More than a month at Unknown time   • potassium chloride (K-DUR,KLOR-CON) 10 MEQ CR tablet Take 10 mEq by mouth Daily.   More than a month at Unknown time   • Red Yeast Rice 600 MG tablet Take 1 tablet by mouth 2 (Two) Times a Day.   More than a month at Unknown time   • rivaroxaban (XARELTO) 20 MG tablet Take 20 mg by mouth Daily.   More than a month at Unknown time   • Zinc 50 MG tablet Take 1 tablet by mouth Daily.   More than a month at Unknown time    allergies:  Patient has no known allergies.    Objective     Vital Signs:   Temp:  [97.3 °F (36.3 °C)-97.9 °F (36.6 °C)] 97.3 °F (36.3 °C)  Heart Rate:  [] 125  Resp:  [18-20] 18  BP: (116-134)/(68-87) 116/80    Physical Exam:     General Appearance:    Alert, cooperative, in no acute distress.   Head:    Normocephalic, without obvious abnormality, atraumatic.   Eyes:            Lids and lashes normal, conjunctivae and sclerae normal, no   icterus, no pallor, corneas clear, PERRLA.   Ears:    Ears appear intact with no abnormalities noted.   Throat:   No oral lesions, no thrush, oral mucosa moist.   Lungs:     Clear to auscultation, respirations regular, even and                  unlabored.    Heart:    Regular rhythm and normal rate, normal S1 and S2, no            murmur, no gallop, no rub, no click.   Abdomen:     Normal bowel sounds, no masses, no organomegaly, soft         non-tender, non-distended, no guarding, no rebound          tenderness.   Genitourinary:  Mendosa catheter in place scrotum intact no signs of any infection in the scrotum.   Rectal:    Deferred.   Extremities:   Moves all extremities well, no edema, no cyanosis, no             redness.   Pulses:   Pulses palpable and equal bilaterally.   Skin:   No bleeding, bruising or rash.   Neurologic:   Cranial nerves 2 - 12 grossly intact, sensation intact, DTR       present and equal bilaterally.       Results Review:    Lab Results (last 24 hours)     Procedure Component Value Units Date/Time    Urine Culture - Urine, Urine, Catheter [[id number]]  (Normal) Collected:  08/01/19 0955    Specimen:  Urine, Catheter Updated:  08/02/19 1443     Urine Culture No growth    Comprehensive Metabolic Panel [[id number]]  (Abnormal) Collected:  08/02/19 1152    Specimen:  Blood Updated:  08/02/19 1252     Glucose 125 mg/dL      BUN 16 mg/dL      Creatinine 0.89 mg/dL      Sodium 139 mmol/L      Potassium 3.6 mmol/L      Chloride 100 mmol/L      CO2 28.0 mmol/L      Calcium 9.1 mg/dL      Total Protein 7.0 g/dL      Albumin 3.80 g/dL      ALT (SGPT) 18 U/L      AST (SGOT) 14 U/L      Alkaline Phosphatase 56 U/L      Total Bilirubin 0.8 mg/dL      eGFR Non African Amer 87 mL/min/1.73      Globulin 3.2 gm/dL      A/G Ratio 1.2 g/dL      BUN/Creatinine Ratio 18.0     Anion Gap 11.0 mmol/L     Narrative:       GFR Normal >60  Chronic Kidney Disease <60  Kidney Failure <15    Urinalysis, Microscopic Only - Urine, Catheter [[id number]]  (Abnormal) Collected:  08/02/19 1217    Specimen:  Urine, Catheter Updated:  08/02/19 1242     RBC, UA 31-50 /HPF      WBC, UA 3-5 /HPF      Bacteria, UA None Seen /HPF      Squamous Epithelial Cells, UA None Seen /HPF      Hyaline Casts, UA 0-2 /LPF      Methodology Automated Microscopy    Urinalysis With Culture If Indicated - Urine, Catheter [[id number]]  (Abnormal) Collected:  08/02/19 1217    Specimen:  Urine,  Catheter Updated:  08/02/19 1241     Color, UA Yellow     Appearance, UA Clear     pH, UA 6.0     Specific Gravity, UA 1.011     Glucose, UA Negative     Ketones, UA Negative     Bilirubin, UA Negative     Blood, UA Large (3+)     Protein, UA Negative     Leuk Esterase, UA Trace     Nitrite, UA Negative     Urobilinogen, UA 1.0 E.U./dL    Protime-INR [[id number]]  (Abnormal) Collected:  08/02/19 1152    Specimen:  Blood Updated:  08/02/19 1230     Protime 24.3 Seconds      INR 2.21    Narrative:       Therapeutic range for most indications is 2.0-3.0 INR,  or 2.5-3.5 for mechanical heart valves.    CBC & Differential [[id number]] Collected:  08/02/19 1152    Specimen:  Blood Updated:  08/02/19 1206    Narrative:       The following orders were created for panel order CBC & Differential.  Procedure                               Abnormality         Status                     ---------                               -----------         ------                     CBC Auto Differential[[id number]]        Abnormal            Final result                 Please view results for these tests on the individual orders.    CBC Auto Differential [[id number]]  (Abnormal) Collected:  08/02/19 1152    Specimen:  Blood Updated:  08/02/19 1206     WBC 8.99 10*3/mm3      RBC 4.81 10*6/mm3      Hemoglobin 14.1 g/dL      Hematocrit 44.1 %      MCV 91.7 fL      MCH 29.3 pg      MCHC 32.0 g/dL      RDW 14.6 %      RDW-SD 49.1 fl      MPV 11.3 fL      Platelets 168 10*3/mm3      Neutrophil % 82.9 %      Lymphocyte % 9.0 %      Monocyte % 6.1 %      Eosinophil % 1.2 %      Basophil % 0.4 %      Immature Grans % 0.4 %      Neutrophils, Absolute 7.44 10*3/mm3      Lymphocytes, Absolute 0.81 10*3/mm3      Monocytes, Absolute 0.55 10*3/mm3      Eosinophils, Absolute 0.11 10*3/mm3      Basophils, Absolute 0.04 10*3/mm3      Immature Grans, Absolute 0.04 10*3/mm3      nRBC 0.0 /100 WBC          Imaging Results (last 24 hours)     ** No results found  for the last 24 hours. **          I reviewed the patient's new clinical results.  I reviewed the patient's new imaging results and agree with the interpretation.  I reviewed the patient's other test results and agree with the interpretation.      Assessment/Plan       Chronic atrial fibrillation (CMS/HCC)    Hypertension    Morbid obesity (CMS/HCC)    BPH (benign prostatic hyperplasia)    Obstructive sleep apnea    Hematuria    Leg edema      Continue to treat urinary tract infection Mendosa catheter.  In view of his past urological history and present symptoms cystoscopy once medically stable this can be done as an outpatient pull Mendosa until we do this    I discussed the patient's findings and my recommendations with patient.     Jamie Parsons MD  08/02/19  6:37 PM

## 2019-08-02 NOTE — PROGRESS NOTES
"    [facility] Medicine Services  INPATIENT PROGRESS NOTE    Length of Stay: 2  Date of Admission: 7/31/2019  Primary Care Physician: Jacklyn Ruelas MD    Subjective   Chief Complaint/HPI: This 63-year-old  male was transferred to our facility from [facility] secondary to generalized weakness, shortness of air, lower extremity edema, palpitations.  Patient has been diagnosed with atrial fibrillation with rapid ventricular response.  Patient started on metoprolol 50 mg tablets every 24, however patient still demonstrates intermittent control.  Will defer to cardiology for further titration of medication.  Patient also has a Mendosa catheter that was placed at [facility].  Patient complains of dysuria and penile pain.  He does have a urine that is positive for hematuria with some bacteria.  Possibly acute hemorrhagic cystitis.  He is on Xarelto for his atrial fibrillation.  Patient does have a history of some type of \"necrotizing fasciitis\" in the pelvic area per patient.      Review of Systems   Constitutional: Negative for chills and fever.   Respiratory: Negative for chest tightness and shortness of breath.    Cardiovascular: Positive for palpitations and leg swelling. Negative for chest pain.   Gastrointestinal: Negative for abdominal pain, nausea and vomiting.   Genitourinary: Positive for difficulty urinating, dysuria, hematuria, penile pain and urgency. Negative for testicular pain.   Neurological: Negative for dizziness and light-headedness.   Psychiatric/Behavioral: Negative for confusion.      All pertinent negatives and positives are as above. All other systems have been reviewed and are negative unless otherwise stated.     Objective    Temp:  [97.2 °F (36.2 °C)-97.9 °F (36.6 °C)] 97.5 °F (36.4 °C)  Heart Rate:  [] 118  Resp:  [18-20] 18  BP: ()/(59-81) 116/81    Physical Exam   Constitutional: He is oriented to " person, place, and time. He appears well-developed and well-nourished.   HENT:   Head: Normocephalic and atraumatic.   Eyes: Conjunctivae are normal.   Cardiovascular: Normal rate and regular rhythm.   Pulmonary/Chest: Effort normal and breath sounds normal. No stridor. No respiratory distress.   Abdominal: Soft. Bowel sounds are normal. He exhibits no distension. There is no tenderness.   Musculoskeletal: He exhibits edema.   Neurological: He is alert and oriented to person, place, and time.   Skin: Skin is warm and dry. Capillary refill takes less than 2 seconds.   Psychiatric: He has a normal mood and affect. His behavior is normal.     Results Review:  I have reviewed the labs, radiology results, and diagnostic studies.    Laboratory Data:   Results from last 7 days   Lab Units 08/01/19  0718 08/01/19  0125   SODIUM mmol/L 138 138   POTASSIUM mmol/L 4.1 4.1   CHLORIDE mmol/L 103 101   CO2 mmol/L 23.0 24.0   BUN mg/dL 14 12   CREATININE mg/dL 0.93 1.02   GLUCOSE mg/dL 154* 153*   CALCIUM mg/dL 8.9 9.0   ANION GAP mmol/L 12.0 13.0     Estimated Creatinine Clearance: 120 mL/min (by C-G formula based on SCr of 0.93 mg/dL).          Results from last 7 days   Lab Units 08/01/19  0125   WBC 10*3/mm3 16.10*   HEMOGLOBIN g/dL 13.2   HEMATOCRIT % 40.9   PLATELETS 10*3/mm3 129*     Results from last 7 days   Lab Units 08/01/19  0152   INR  2.77*       Culture Data:   No results found for: BLOODCX  No results found for: URINECX  No results found for: RESPCX  No results found for: WOUNDCX  No results found for: STOOLCX  No components found for: BODYFLD    Radiology Data:   Imaging Results (last 24 hours)     ** No results found for the last 24 hours. **          I have reviewed the patient's current medications.     Assessment/Plan     Active Hospital Problems    Diagnosis   • **Chronic atrial fibrillation (CMS/HCC)   • Obstructive sleep apnea   • Hematuria   • Leg edema   • Morbid obesity (CMS/HCC)   • BPH (benign  prostatic hyperplasia)   • Hypertension       Plan: CBC, CMP, INR.  A.m. labs.  Repeat urinalysis with culture.  Initiate ceftriaxone 1 g every 24 hours.  Urology to see, consult much appreciated.  Rate control deferred to cardiology.              This document has been electronically signed by PRINCE Castellon on August 2, 2019 11:43 AM

## 2019-08-02 NOTE — PROGRESS NOTES
"Discharge Planning Assessment  [facility]     Patient Name: Arsh Salas  MRN: [id number]  Today's Date: 8/2/2019    Admit Date: 7/31/2019    Discharge Needs Assessment     Row Name 08/02/19 0926       Living Environment    Lives With  alone    Current Living Arrangements  other (see comments) Information on face sheet confirmed with patient.  [place].     Duration at Residence  Patient voiced that he has lived in his camper for 18yrs.    Potentially Unsafe Housing Conditions  no indoor plumbing;other (see comments) Patient voiced that he has access to water. He purchases water for cooking and reports \"[place]\" [place]. He has made arrangements to use the bathroom outdoors. He goes to a local gym for bathing.    Primary Care Provided by  self    Provides Primary Care For  no one    Family Caregiver if Needed  none    Quality of Family Relationships  helpful    Able to Return to Prior Arrangements  yes    Living Arrangement Comments  Patient stated that he has lived in his \"[employer]abover\" camper for 18yrs. SWRK assessed for any needs re: current living situation. Patient voiced that he has a generator and a cooking stove. As SWRK asked questions to assess for needs when patient stated, \"Is there a problem with that?\"  Patient was referring to his current living situation. SWRK informed patient that she did not but voiced that if he had voiced any needs, she maybe able to offer him resources. Patient denied any needs.  Patient has a working vehicle and an active drivers license. SWRk spoke with patient re: noncompliance with home medications. Patient voiced rx coverage. Patient had voiced that he stopped taking his medications because he did not want to take them any longer. SWRK encouraged patient to contact his provider and discuss with his PCP any medications that he plans to discontinue prior to d/c. SWRK explained potential risk of side " effects if he stops taking his medications all at once. Patient was argumentative and voiced that there are side effects when you take medications. SWRk reinforced importance of adgering to medication compliance.        Resource/Environmental Concerns    Resource/Environmental Concerns  none    Transportation Concerns  car, none       Transition Planning    Patient/Family Anticipates Transition to  home    Patient/Family Anticipated Services at Transition  none    Transportation Anticipated  car, drives self       Discharge Needs Assessment    Readmission Within the Last 30 Days  no previous admission in last 30 days    Concerns to be Addressed  denies needs/concerns at this time;adjustment to diagnosis/illness;compliance issue    Concerns Comments  Patient stated that he is compliant with low sodium diet. He also voiced that he wears his CPAP regularly. Patient declines Home Health services.     Equipment Currently Used at Home  bipap/cpap Patient has CPAP. He does not recall name of the DME vendor.     Anticipated Changes Related to Illness  none    Equipment Needed After Discharge  none    Current Discharge Risk  chronically ill CHF        Discharge Plan     Row Name 08/02/19 0951       Plan    Plan  home with no services.    Plan Comments  LACE complete. Patient denies any needs at this time. Continue with medical management....[facility]        Destination      No service coordination in this encounter.      Durable Medical Equipment      No service coordination in this encounter.      Dialysis/Infusion      No service coordination in this encounter.      Home Medical Care      No service coordination in this encounter.      Therapy      No service coordination in this encounter.      Community Resources      No service coordination in this encounter.        Expected Discharge Date and Time     Expected Discharge Date Expected Discharge Time    Aug 3, 2019         Demographic Summary     Row Name  08/02/19 0924       General Information    Admission Type  inpatient    Arrived From  emergency department    Referral Source  high risk screening LACE score 7    Preferred Language  English     Used During This Interaction  no       Contact Information    Contact Information Obtained for          Functional Status     Row Name 08/02/19 0924       Functional Status    Usual Activity Tolerance  fair    Current Activity Tolerance  fair    Functional Status Comments  Patient voiced independence with ADL's. He denies use of any assistive device for ambulation.        Functional Status, IADL    Medications  independent;other (see comments) Pharmacy, [facility], and prescriptions coverage confirmed with patient.  Patient stated that his daughter will assist him at d/c with placement of medications into a medi planner.     Meal Preparation  independent    Housekeeping  independent    Laundry  independent    Shopping  independent       Mental Status Summary    Recent Changes in Mental Status/Cognitive Functioning  no changes       Employment/    Employment Status  retired        Psychosocial    No documentation.       Abuse/Neglect    No documentation.       Legal    No documentation.       Substance Abuse    No documentation.       Patient Forms    No documentation.           OFELIA Freire

## 2019-08-03 ENCOUNTER — APPOINTMENT (OUTPATIENT)
Dept: CT IMAGING | Facility: HOSPITAL | Age: 64
End: 2019-08-03

## 2019-08-03 PROCEDURE — 74176 CT ABD & PELVIS W/O CONTRAST: CPT

## 2019-08-03 PROCEDURE — 25010000002 CEFTRIAXONE PER 250 MG: Performed by: PHYSICIAN ASSISTANT

## 2019-08-03 PROCEDURE — 25010000002 DIGOXIN PER 500 MCG: Performed by: INTERNAL MEDICINE

## 2019-08-03 PROCEDURE — 25010000002 FUROSEMIDE PER 20 MG: Performed by: PHYSICIAN ASSISTANT

## 2019-08-03 RX ORDER — PHENAZOPYRIDINE HYDROCHLORIDE 100 MG/1
100 TABLET, FILM COATED ORAL
Status: DISCONTINUED | OUTPATIENT
Start: 2019-08-03 | End: 2019-08-05 | Stop reason: HOSPADM

## 2019-08-03 RX ORDER — DIGOXIN 0.25 MG/ML
250 INJECTION INTRAMUSCULAR; INTRAVENOUS ONCE
Status: COMPLETED | OUTPATIENT
Start: 2019-08-03 | End: 2019-08-03

## 2019-08-03 RX ORDER — DIGOXIN 125 MCG
125 TABLET ORAL
Status: DISCONTINUED | OUTPATIENT
Start: 2019-08-04 | End: 2019-08-05 | Stop reason: HOSPADM

## 2019-08-03 RX ORDER — DIGOXIN 125 MCG
125 TABLET ORAL
Status: DISCONTINUED | OUTPATIENT
Start: 2019-08-04 | End: 2019-08-03

## 2019-08-03 RX ORDER — TAMSULOSIN HYDROCHLORIDE 0.4 MG/1
0.4 CAPSULE ORAL DAILY
Status: DISCONTINUED | OUTPATIENT
Start: 2019-08-03 | End: 2019-08-05 | Stop reason: HOSPADM

## 2019-08-03 RX ADMIN — DIGOXIN 250 MCG: 0.25 INJECTION INTRAMUSCULAR; INTRAVENOUS at 10:53

## 2019-08-03 RX ADMIN — RIVAROXABAN 20 MG: 10 TABLET, FILM COATED ORAL at 09:08

## 2019-08-03 RX ADMIN — LISINOPRIL 5 MG: 5 TABLET ORAL at 09:08

## 2019-08-03 RX ADMIN — PHENAZOPYRIDINE HYDROCHLORIDE 100 MG: 100 TABLET ORAL at 12:49

## 2019-08-03 RX ADMIN — TAMSULOSIN HYDROCHLORIDE 0.4 MG: 0.4 CAPSULE ORAL at 12:49

## 2019-08-03 RX ADMIN — FUROSEMIDE 40 MG: 10 INJECTION, SOLUTION INTRAMUSCULAR; INTRAVENOUS at 09:08

## 2019-08-03 RX ADMIN — FUROSEMIDE 40 MG: 10 INJECTION, SOLUTION INTRAMUSCULAR; INTRAVENOUS at 20:39

## 2019-08-03 RX ADMIN — CEFTRIAXONE SODIUM 1 G: 1 INJECTION, POWDER, FOR SOLUTION INTRAMUSCULAR; INTRAVENOUS at 10:55

## 2019-08-03 RX ADMIN — SODIUM CHLORIDE, PRESERVATIVE FREE 3 ML: 5 INJECTION INTRAVENOUS at 09:08

## 2019-08-03 RX ADMIN — FINASTERIDE 5 MG: 5 TABLET, FILM COATED ORAL at 09:08

## 2019-08-03 RX ADMIN — PHENAZOPYRIDINE HYDROCHLORIDE 100 MG: 100 TABLET ORAL at 17:54

## 2019-08-03 RX ADMIN — METOPROLOL SUCCINATE 50 MG: 50 TABLET, EXTENDED RELEASE ORAL at 09:09

## 2019-08-03 NOTE — PROGRESS NOTES
LOS: 3 days     Patient Care Team:  Jacklyn Ruelas MD as PCP - General (Internal Medicine)      Subjective     UTI urinary retention    Objective       Vital Signs  Temp:  [97 °F (36.1 °C)-97.7 °F (36.5 °C)] 97.6 °F (36.4 °C)  Heart Rate:  [] 103  Resp:  [18] 18  BP: (115-141)/(75-87) 141/83    Physical Exam:        General Appearance:   No acute distress talkative     Respiratory:    UNLABORED RESPIRATIONS.     Abdomen:     SOFT.  Nontender     Genitourinary:  Mendosa in place scrotum shows no signs of infection     Rectal:     DEFERRED       Results Review:       Imaging Results (last 24 hours)     Procedure Component Value Units Date/Time    CT Abdomen Pelvis Without Contrast [[id number]] Updated:  08/03/19 1043        Lab Results (last 24 hours)     Procedure Component Value Units Date/Time    Urine Culture - Urine, Urine, Catheter [[id number]]  (Normal) Collected:  08/01/19 0955    Specimen:  Urine, Catheter Updated:  08/02/19 1443     Urine Culture No growth    Comprehensive Metabolic Panel [[id number]]  (Abnormal) Collected:  08/02/19 1152    Specimen:  Blood Updated:  08/02/19 1252     Glucose 125 mg/dL      BUN 16 mg/dL      Creatinine 0.89 mg/dL      Sodium 139 mmol/L      Potassium 3.6 mmol/L      Chloride 100 mmol/L      CO2 28.0 mmol/L      Calcium 9.1 mg/dL      Total Protein 7.0 g/dL      Albumin 3.80 g/dL      ALT (SGPT) 18 U/L      AST (SGOT) 14 U/L      Alkaline Phosphatase 56 U/L      Total Bilirubin 0.8 mg/dL      eGFR Non African Amer 87 mL/min/1.73      Globulin 3.2 gm/dL      A/G Ratio 1.2 g/dL      BUN/Creatinine Ratio 18.0     Anion Gap 11.0 mmol/L     Narrative:       GFR Normal >60  Chronic Kidney Disease <60  Kidney Failure <15    Urinalysis, Microscopic Only - Urine, Catheter [[id number]]  (Abnormal) Collected:  08/02/19 1217    Specimen:  Urine, Catheter Updated:  08/02/19 1242     RBC, UA 31-50 /HPF      WBC, UA 3-5 /HPF      Bacteria, UA None Seen /HPF      Squamous Epithelial  Cells, UA None Seen /HPF      Hyaline Casts, UA 0-2 /LPF      Methodology Automated Microscopy    Urinalysis With Culture If Indicated - Urine, Catheter [[id number]]  (Abnormal) Collected:  08/02/19 1217    Specimen:  Urine, Catheter Updated:  08/02/19 1241     Color, UA Yellow     Appearance, UA Clear     pH, UA 6.0     Specific Gravity, UA 1.011     Glucose, UA Negative     Ketones, UA Negative     Bilirubin, UA Negative     Blood, UA Large (3+)     Protein, UA Negative     Leuk Esterase, UA Trace     Nitrite, UA Negative     Urobilinogen, UA 1.0 E.U./dL    Protime-INR [[id number]]  (Abnormal) Collected:  08/02/19 1152    Specimen:  Blood Updated:  08/02/19 1230     Protime 24.3 Seconds      INR 2.21    Narrative:       Therapeutic range for most indications is 2.0-3.0 INR,  or 2.5-3.5 for mechanical heart valves.    CBC & Differential [[id number]] Collected:  08/02/19 1152    Specimen:  Blood Updated:  08/02/19 1206    Narrative:       The following orders were created for panel order CBC & Differential.  Procedure                               Abnormality         Status                     ---------                               -----------         ------                     CBC Auto Differential[[id number]]        Abnormal            Final result                 Please view results for these tests on the individual orders.    CBC Auto Differential [[id number]]  (Abnormal) Collected:  08/02/19 1152    Specimen:  Blood Updated:  08/02/19 1206     WBC 8.99 10*3/mm3      RBC 4.81 10*6/mm3      Hemoglobin 14.1 g/dL      Hematocrit 44.1 %      MCV 91.7 fL      MCH 29.3 pg      MCHC 32.0 g/dL      RDW 14.6 %      RDW-SD 49.1 fl      MPV 11.3 fL      Platelets 168 10*3/mm3      Neutrophil % 82.9 %      Lymphocyte % 9.0 %      Monocyte % 6.1 %      Eosinophil % 1.2 %      Basophil % 0.4 %      Immature Grans % 0.4 %      Neutrophils, Absolute 7.44 10*3/mm3      Lymphocytes, Absolute 0.81 10*3/mm3      Monocytes, Absolute  0.55 10*3/mm3      Eosinophils, Absolute 0.11 10*3/mm3      Basophils, Absolute 0.04 10*3/mm3      Immature Grans, Absolute 0.04 10*3/mm3      nRBC 0.0 /100 WBC             I reviewed the patient's new clinical results.  I reviewed the patient's new imaging results and agree with the interpretation.  I reviewed the patient's other test results and agree with the interpretation        Assessment/Plan       Chronic atrial fibrillation (CMS/HCC)    Hypertension    Morbid obesity (CMS/HCC)    BPH (benign prostatic hyperplasia)    Obstructive sleep apnea    Hematuria    Leg edema      Continue IV antibiotics and cultures sensitivities.  Try to remove Mendosa in a.m. check residuals      Jamie Parsons MD  08/03/19  11:29 AM  UTI urinary retention

## 2019-08-03 NOTE — PROGRESS NOTES
"    [facility] Medicine Services  INPATIENT PROGRESS NOTE    Length of Stay: 3  Date of Admission: 7/31/2019  Primary Care Physician: Jacklyn Ruelas MD    Subjective   Please note that all previous progress notes, lab findings, radiographical findings, medication changes, and physical exam findings have been noted and updated as appropriate.    8/3/2019: Patient heart rate still intermittently controlled, cardiology has made adjustments to medications.  Has been seen by urology who has ordered removal of Mendosa catheter in the a.m.  Have added Pyridium and Flomax to assist with dysuria and hesitancy.  No nephrolithiasis noted on CT scan, possible recently passed stone.  No other acute findings on CT the abdomen and pelvis.    Chief Complaint/HPI: This 63-year-old  male was transferred to our facility from [facility] secondary to generalized weakness, shortness of air, lower extremity edema, palpitations. Patient has been diagnosed with atrial fibrillation with rapid ventricular response.  Patient started on metoprolol 50 mg tablets every 24, however patient still demonstrates intermittent control.  Will defer to cardiology for further titration of medication.  Patient also has a Mendosa catheter that was placed at [facility].  Patient complains of dysuria and penile pain.  He does have a urine that is positive for hematuria with some bacteria.  Possibly acute hemorrhagic cystitis.  He is on Xarelto for his atrial fibrillation.  Patient does have a history of some type of \"necrotizing fasciitis\" in the pelvic area per patient.      Review of Systems   Constitutional: Negative for chills and fever.   Respiratory: Negative for chest tightness and shortness of breath.    Cardiovascular: Positive for leg swelling. Negative for chest pain and palpitations.   Gastrointestinal: Negative for abdominal pain, nausea and vomiting.   Genitourinary: " Positive for difficulty urinating, dysuria, hematuria, penile pain and urgency. Negative for testicular pain.   Neurological: Negative for dizziness and light-headedness.   Psychiatric/Behavioral: Negative for confusion.      All pertinent negatives and positives are as above. All other systems have been reviewed and are negative unless otherwise stated.     Objective    Temp:  [97 °F (36.1 °C)-97.7 °F (36.5 °C)] 97.6 °F (36.4 °C)  Heart Rate:  [] 103  Resp:  [18] 18  BP: (115-141)/(75-83) 141/83    Physical Exam   Constitutional: He is oriented to person, place, and time. He appears well-developed and well-nourished.   HENT:   Head: Normocephalic and atraumatic.   Eyes: Conjunctivae are normal.   Cardiovascular: Normal rate and regular rhythm.   Pulmonary/Chest: Effort normal and breath sounds normal. No stridor. No respiratory distress.   Abdominal: Soft. Bowel sounds are normal. He exhibits no distension. There is no tenderness.   Musculoskeletal: He exhibits edema.   Neurological: He is alert and oriented to person, place, and time.   Skin: Skin is warm and dry. Capillary refill takes less than 2 seconds.   Psychiatric: He has a normal mood and affect. His behavior is normal.     Results Review:  I have reviewed the labs, radiology results, and diagnostic studies.    Laboratory Data:   Results from last 7 days   Lab Units 08/02/19  1152 08/01/19  0718 08/01/19  0125   SODIUM mmol/L 139 138 138   POTASSIUM mmol/L 3.6 4.1 4.1   CHLORIDE mmol/L 100 103 101   CO2 mmol/L 28.0 23.0 24.0   BUN mg/dL 16 14 12   CREATININE mg/dL 0.89 0.93 1.02   GLUCOSE mg/dL 125* 154* 153*   CALCIUM mg/dL 9.1 8.9 9.0   BILIRUBIN mg/dL 0.8  --   --    ALK PHOS U/L 56  --   --    ALT (SGPT) U/L 18  --   --    AST (SGOT) U/L 14  --   --    ANION GAP mmol/L 11.0 12.0 13.0     Estimated Creatinine Clearance: 122.9 mL/min (by C-G formula based on SCr of 0.89 mg/dL).          Results from last 7 days   Lab Units 08/02/19  5511  08/01/19  0125   WBC 10*3/mm3 8.99 16.10*   HEMOGLOBIN g/dL 14.1 13.2   HEMATOCRIT % 44.1 40.9   PLATELETS 10*3/mm3 168 129*     Results from last 7 days   Lab Units 08/02/19  1152 08/01/19  0152   INR  2.21* 2.77*       Culture Data:   No results found for: BLOODCX  Urine Culture   Date Value Ref Range Status   08/01/2019 No growth  Final     No results found for: RESPCX  No results found for: WOUNDCX  No results found for: STOOLCX  No components found for: BODYFLD    Radiology Data:   Imaging Results (last 24 hours)     Procedure Component Value Units Date/Time    CT Abdomen Pelvis Without Contrast [[id number]] Collected:  08/03/19 1033     Updated:  08/03/19 1142    Narrative:       PROCEDURE: CT ABDOMEN PELVIS WO CONTRAST    INDICATION: abdominal pain, suspected kidney stones    COMPARISON:  None    TECHNIQUE:      - stone protocol      - contrast:  None     This exam was performed according to our departmental  dose-optimization program, which includes automated exposure  control, adjustment of the mA and/or kV according to patient size  and/or use of iterative reconstruction technique.  DLP is 1929.6    FINDINGS:     - - - CT THORAX (inferior, visualized portions): - - -      - lung bases:  Negative    - pleura:  Negative    - misc:  Unremarkable     - - -CT ABDOMEN: - - -      - Kidney, RIGHT:      - size:  Normal ; contour:  Normal.        - perinephric stranding:  None      - nephrolithiasis:  None        - collecting system: not dilated.      - Ureter, RIGHT:      - caliber:  Normal      - course:  Normal      - ureterolithiasis:  None      - Kidney, LEFT:      - size:  normal ; contour:  normal.        - perinephric stranding:  none      - nephrolithiasis:  none        - collecting system: not dilated.  Low-attenuation 3.4 x 2.4 x 2.4 cm parapelvic cyst is present     - Ureter, LEFT:      - caliber:  normal      - course:  normal      - ureterolithiasis:  none.  There is minimal periureteral mesenteric  stranding distally, of  uncertain etiology.      - Misc:     - solid organs (limited due to the lack of intravenous  contrast):        -The gallbladder is contracted, and not well visualized.     - visualized bowel (limited due to the lack of oral contrast):        - grossly negative     - - -CT Pelvis: - - -      - Bladder:  no gross evidence of pathology.    - Misc:      - solid organs (limited due to the lack of intravenous  contrast):         - Grossly negative       - visualized bowel (limited due to the lack of oral  contrast):         -Scattered colonic diverticuli without radiographic  evidence of diverticulitis. The appendix is visualized and  appears normal  The bladder is decompressed with a catheter.  Left inguinal hernia is present, containing only fat  There appears to be a right hydrocele      Impression:       1. No evidence of nephro / uretero lithiasis or obstructive  uropathy.  2. Minimal mesenteric stranding along the course of the distal  left ureter of uncertain etiology or clinical importance. Recent  passage of calculus is a possibility.  3. Simple appearing left parapelvic cyst  4. Right hydrocele  5. Colonic diverticulosis without radiographic evidence of  diverticulitis.  6. Small left inguinal hernia containing only fat  7. Right hydrocele  8. Please see findings section above for further details    Electronically signed by:  Ely Luz MD  8/3/2019 11:40 AM CDT  Workstation: [id number]          I have reviewed the patient's current medications.     Assessment/Plan     Active Hospital Problems    Diagnosis   • **Chronic atrial fibrillation (CMS/HCC)   • Obstructive sleep apnea   • Hematuria   • Leg edema   • Morbid obesity (CMS/HCC)   • BPH (benign prostatic hyperplasia)   • Hypertension       Plan: Urology consult appreciated, remove Mendosa catheter in the a.m. with outpatient cystoscopy to be planned.  Digoxin titration today, cardiology appreciated.            This document has been  electronically signed by PRINCE Castellon on August 3, 2019 12:21 PM

## 2019-08-03 NOTE — PLAN OF CARE
Problem: Patient Care Overview  Goal: Plan of Care Review  Outcome: Ongoing (interventions implemented as appropriate)   08/03/19 0824   Coping/Psychosocial   Plan of Care Reviewed With patient   Plan of Care Review   Progress no change

## 2019-08-03 NOTE — PLAN OF CARE
Problem: Patient Care Overview  Goal: Plan of Care Review  Outcome: Ongoing (interventions implemented as appropriate)   08/03/19 0258   Coping/Psychosocial   Plan of Care Reviewed With patient   Plan of Care Review   Progress no change

## 2019-08-04 LAB
ALBUMIN SERPL-MCNC: 4 G/DL (ref 3.5–5.2)
ALBUMIN/GLOB SERPL: 1.1 G/DL
ALP SERPL-CCNC: 63 U/L (ref 39–117)
ALT SERPL W P-5'-P-CCNC: 21 U/L (ref 1–41)
ANION GAP SERPL CALCULATED.3IONS-SCNC: 14 MMOL/L (ref 5–15)
AST SERPL-CCNC: 20 U/L (ref 1–40)
BASOPHILS # BLD AUTO: 0.06 10*3/MM3 (ref 0–0.2)
BASOPHILS NFR BLD AUTO: 0.9 % (ref 0–1.5)
BILIRUB SERPL-MCNC: 0.9 MG/DL (ref 0.2–1.2)
BUN BLD-MCNC: 15 MG/DL (ref 8–23)
BUN/CREAT SERPL: 17.2 (ref 7–25)
CALCIUM SPEC-SCNC: 9.7 MG/DL (ref 8.6–10.5)
CHLORIDE SERPL-SCNC: 99 MMOL/L (ref 98–107)
CO2 SERPL-SCNC: 28 MMOL/L (ref 22–29)
CREAT BLD-MCNC: 0.87 MG/DL (ref 0.76–1.27)
DEPRECATED RDW RBC AUTO: 48.7 FL (ref 37–54)
EOSINOPHIL # BLD AUTO: 0.14 10*3/MM3 (ref 0–0.4)
EOSINOPHIL NFR BLD AUTO: 2.1 % (ref 0.3–6.2)
ERYTHROCYTE [DISTWIDTH] IN BLOOD BY AUTOMATED COUNT: 14.5 % (ref 12.3–15.4)
GFR SERPL CREATININE-BSD FRML MDRD: 89 ML/MIN/1.73
GLOBULIN UR ELPH-MCNC: 3.6 GM/DL
GLUCOSE BLD-MCNC: 114 MG/DL (ref 65–99)
HCT VFR BLD AUTO: 49.9 % (ref 37.5–51)
HGB BLD-MCNC: 16.3 G/DL (ref 13–17.7)
IMM GRANULOCYTES # BLD AUTO: 0.05 10*3/MM3 (ref 0–0.05)
IMM GRANULOCYTES NFR BLD AUTO: 0.7 % (ref 0–0.5)
LYMPHOCYTES # BLD AUTO: 1.6 10*3/MM3 (ref 0.7–3.1)
LYMPHOCYTES NFR BLD AUTO: 23.9 % (ref 19.6–45.3)
MCH RBC QN AUTO: 30 PG (ref 26.6–33)
MCHC RBC AUTO-ENTMCNC: 32.7 G/DL (ref 31.5–35.7)
MCV RBC AUTO: 91.9 FL (ref 79–97)
MONOCYTES # BLD AUTO: 0.92 10*3/MM3 (ref 0.1–0.9)
MONOCYTES NFR BLD AUTO: 13.7 % (ref 5–12)
NEUTROPHILS # BLD AUTO: 3.93 10*3/MM3 (ref 1.7–7)
NEUTROPHILS NFR BLD AUTO: 58.7 % (ref 42.7–76)
NRBC BLD AUTO-RTO: 0 /100 WBC (ref 0–0.2)
PLATELET # BLD AUTO: 194 10*3/MM3 (ref 140–450)
PMV BLD AUTO: 10.8 FL (ref 6–12)
POTASSIUM BLD-SCNC: 4.4 MMOL/L (ref 3.5–5.2)
PROT SERPL-MCNC: 7.6 G/DL (ref 6–8.5)
RBC # BLD AUTO: 5.43 10*6/MM3 (ref 4.14–5.8)
SODIUM BLD-SCNC: 141 MMOL/L (ref 136–145)
WBC NRBC COR # BLD: 6.7 10*3/MM3 (ref 3.4–10.8)

## 2019-08-04 PROCEDURE — 25010000002 FUROSEMIDE PER 20 MG: Performed by: PHYSICIAN ASSISTANT

## 2019-08-04 PROCEDURE — 80053 COMPREHEN METABOLIC PANEL: CPT | Performed by: PHYSICIAN ASSISTANT

## 2019-08-04 PROCEDURE — 85025 COMPLETE CBC W/AUTO DIFF WBC: CPT | Performed by: PHYSICIAN ASSISTANT

## 2019-08-04 PROCEDURE — 25010000002 CEFTRIAXONE PER 250 MG: Performed by: PHYSICIAN ASSISTANT

## 2019-08-04 RX ADMIN — SODIUM CHLORIDE, PRESERVATIVE FREE 3 ML: 5 INJECTION INTRAVENOUS at 20:48

## 2019-08-04 RX ADMIN — SODIUM CHLORIDE, PRESERVATIVE FREE 10 ML: 5 INJECTION INTRAVENOUS at 08:55

## 2019-08-04 RX ADMIN — LISINOPRIL 5 MG: 5 TABLET ORAL at 08:54

## 2019-08-04 RX ADMIN — RIVAROXABAN 20 MG: 10 TABLET, FILM COATED ORAL at 08:53

## 2019-08-04 RX ADMIN — TAMSULOSIN HYDROCHLORIDE 0.4 MG: 0.4 CAPSULE ORAL at 08:53

## 2019-08-04 RX ADMIN — CEFTRIAXONE SODIUM 1 G: 1 INJECTION, POWDER, FOR SOLUTION INTRAMUSCULAR; INTRAVENOUS at 11:11

## 2019-08-04 RX ADMIN — FINASTERIDE 5 MG: 5 TABLET, FILM COATED ORAL at 08:54

## 2019-08-04 RX ADMIN — FUROSEMIDE 40 MG: 10 INJECTION, SOLUTION INTRAMUSCULAR; INTRAVENOUS at 20:41

## 2019-08-04 RX ADMIN — PHENAZOPYRIDINE HYDROCHLORIDE 100 MG: 100 TABLET ORAL at 11:11

## 2019-08-04 RX ADMIN — PHENAZOPYRIDINE HYDROCHLORIDE 100 MG: 100 TABLET ORAL at 08:54

## 2019-08-04 RX ADMIN — METOPROLOL SUCCINATE 50 MG: 50 TABLET, EXTENDED RELEASE ORAL at 08:54

## 2019-08-04 RX ADMIN — DIGOXIN 125 MCG: 125 TABLET ORAL at 11:11

## 2019-08-04 RX ADMIN — PHENAZOPYRIDINE HYDROCHLORIDE 100 MG: 100 TABLET ORAL at 17:33

## 2019-08-04 RX ADMIN — FUROSEMIDE 40 MG: 10 INJECTION, SOLUTION INTRAMUSCULAR; INTRAVENOUS at 08:54

## 2019-08-04 NOTE — PLAN OF CARE
Problem: Patient Care Overview  Goal: Plan of Care Review  Outcome: Ongoing (interventions implemented as appropriate)   08/04/19 0113   Coping/Psychosocial   Plan of Care Reviewed With patient   Plan of Care Review   Progress improving

## 2019-08-04 NOTE — PLAN OF CARE
Problem: Patient Care Overview  Goal: Plan of Care Review  Outcome: Ongoing (interventions implemented as appropriate)   08/04/19 1421   Coping/Psychosocial   Plan of Care Reviewed With patient   Plan of Care Review   Progress improving       Problem: Fall Risk (Adult)  Goal: Identify Related Risk Factors and Signs and Symptoms  Outcome: Outcome(s) achieved Date Met: 08/04/19    Goal: Absence of Fall  Outcome: Ongoing (interventions implemented as appropriate)      Problem: Pain, Chronic (Adult)  Goal: Acceptable Pain/Comfort Level and Functional Ability  Outcome: Ongoing (interventions implemented as appropriate)      Problem: Urinary Tract Infection (Adult)  Goal: Signs and Symptoms of Listed Potential Problems Will be Absent, Minimized or Managed (Urinary Tract Infection)  Outcome: Ongoing (interventions implemented as appropriate)      Problem: Arrhythmia/Dysrhythmia (Symptomatic) (Adult)  Goal: Signs and Symptoms of Listed Potential Problems Will be Absent, Minimized or Managed (Arrhythmia/Dysrhythmia)  Outcome: Ongoing (interventions implemented as appropriate)      Problem: Cardiac: Heart Failure (Adult)  Goal: Signs and Symptoms of Listed Potential Problems Will be Absent, Minimized or Managed (Cardiac: Heart Failure)  Outcome: Ongoing (interventions implemented as appropriate)

## 2019-08-04 NOTE — PROGRESS NOTES
LOS: 4 days     Patient Care Team:  Jacklyn Ruelas MD as PCP - General (Internal Medicine)      Subjective    urinary retention UTI.  BPH    Objective       Vital Signs  Temp:  [96.9 °F (36.1 °C)-97.7 °F (36.5 °C)] 97.3 °F (36.3 °C)  Heart Rate:  [] 101  Resp:  [18] 18  BP: (109-161)/(55-82) 137/82    Physical Exam:        General Appearance:   No distress     Respiratory:    UNLABORED RESPIRATIONS.     Abdomen:     SOFT.       Genitourinary:  Mendosa out voiding clear urine some dysuria     Rectal:     DEFERRED       Results Review:       Imaging Results (last 24 hours)     Procedure Component Value Units Date/Time    CT Abdomen Pelvis Without Contrast [[id number]] Collected:  08/03/19 1033     Updated:  08/03/19 1142    Narrative:       PROCEDURE: CT ABDOMEN PELVIS WO CONTRAST    INDICATION: abdominal pain, suspected kidney stones    COMPARISON:  None    TECHNIQUE:      - stone protocol      - contrast:  None     This exam was performed according to our departmental  dose-optimization program, which includes automated exposure  control, adjustment of the mA and/or kV according to patient size  and/or use of iterative reconstruction technique.  DLP is 1929.6    FINDINGS:     - - - CT THORAX (inferior, visualized portions): - - -      - lung bases:  Negative    - pleura:  Negative    - misc:  Unremarkable     - - -CT ABDOMEN: - - -      - Kidney, RIGHT:      - size:  Normal ; contour:  Normal.        - perinephric stranding:  None      - nephrolithiasis:  None        - collecting system: not dilated.      - Ureter, RIGHT:      - caliber:  Normal      - course:  Normal      - ureterolithiasis:  None      - Kidney, LEFT:      - size:  normal ; contour:  normal.        - perinephric stranding:  none      - nephrolithiasis:  none        - collecting system: not dilated.  Low-attenuation 3.4 x 2.4 x 2.4 cm parapelvic cyst is present     - Ureter, LEFT:      - caliber:  normal      - course:  normal      -  ureterolithiasis:  none.  There is minimal periureteral mesenteric stranding distally, of  uncertain etiology.      - Misc:     - solid organs (limited due to the lack of intravenous  contrast):        -The gallbladder is contracted, and not well visualized.     - visualized bowel (limited due to the lack of oral contrast):        - grossly negative     - - -CT Pelvis: - - -      - Bladder:  no gross evidence of pathology.    - Misc:      - solid organs (limited due to the lack of intravenous  contrast):         - Grossly negative       - visualized bowel (limited due to the lack of oral  contrast):         -Scattered colonic diverticuli without radiographic  evidence of diverticulitis. The appendix is visualized and  appears normal  The bladder is decompressed with a catheter.  Left inguinal hernia is present, containing only fat  There appears to be a right hydrocele      Impression:       1. No evidence of nephro / uretero lithiasis or obstructive  uropathy.  2. Minimal mesenteric stranding along the course of the distal  left ureter of uncertain etiology or clinical importance. Recent  passage of calculus is a possibility.  3. Simple appearing left parapelvic cyst  4. Right hydrocele  5. Colonic diverticulosis without radiographic evidence of  diverticulitis.  6. Small left inguinal hernia containing only fat  7. Right hydrocele  8. Please see findings section above for further details    Electronically signed by:  Ely Luz MD  8/3/2019 11:40 AM CDT  Workstation: [id number]        Lab Results (last 24 hours)     Procedure Component Value Units Date/Time    Comprehensive Metabolic Panel [[id number]]  (Abnormal) Collected:  08/04/19 0630    Specimen:  Blood Updated:  08/04/19 0658     Glucose 114 mg/dL      BUN 15 mg/dL      Creatinine 0.87 mg/dL      Sodium 141 mmol/L      Potassium 4.4 mmol/L      Chloride 99 mmol/L      CO2 28.0 mmol/L      Calcium 9.7 mg/dL      Total Protein 7.6 g/dL      Albumin 4.00 g/dL       ALT (SGPT) 21 U/L      AST (SGOT) 20 U/L      Alkaline Phosphatase 63 U/L      Total Bilirubin 0.9 mg/dL      eGFR Non African Amer 89 mL/min/1.73      Globulin 3.6 gm/dL      A/G Ratio 1.1 g/dL      BUN/Creatinine Ratio 17.2     Anion Gap 14.0 mmol/L     Narrative:       GFR Normal >60  Chronic Kidney Disease <60  Kidney Failure <15    CBC & Differential [[id number]] Collected:  08/04/19 0630    Specimen:  Blood Updated:  08/04/19 0653    Narrative:       The following orders were created for panel order CBC & Differential.  Procedure                               Abnormality         Status                     ---------                               -----------         ------                     CBC Auto Differential[[id number]]        Abnormal            Final result                 Please view results for these tests on the individual orders.    CBC Auto Differential [[id number]]  (Abnormal) Collected:  08/04/19 0630    Specimen:  Blood Updated:  08/04/19 0653     WBC 6.70 10*3/mm3      RBC 5.43 10*6/mm3      Hemoglobin 16.3 g/dL      Hematocrit 49.9 %      MCV 91.9 fL      MCH 30.0 pg      MCHC 32.7 g/dL      RDW 14.5 %      RDW-SD 48.7 fl      MPV 10.8 fL      Platelets 194 10*3/mm3      Neutrophil % 58.7 %      Lymphocyte % 23.9 %      Monocyte % 13.7 %      Eosinophil % 2.1 %      Basophil % 0.9 %      Immature Grans % 0.7 %      Neutrophils, Absolute 3.93 10*3/mm3      Lymphocytes, Absolute 1.60 10*3/mm3      Monocytes, Absolute 0.92 10*3/mm3      Eosinophils, Absolute 0.14 10*3/mm3      Basophils, Absolute 0.06 10*3/mm3      Immature Grans, Absolute 0.05 10*3/mm3      nRBC 0.0 /100 WBC             I reviewed the patient's new clinical results.  I reviewed the patient's new imaging results and agree with the interpretation.  I reviewed the patient's other test results and agree with the interpretation        Assessment/Plan       Chronic atrial fibrillation (CMS/HCC)    Hypertension    Morbid obesity  (CMS/HCC)    BPH (benign prostatic hyperplasia)    Obstructive sleep apnea    Hematuria    Leg edema      Home on antibiotics from our standpoint he can have cystoscopy in the office Tuesday or Wednesday as an outpatient.  Feel this to the test is deemed necessary as he has dysuria urinary tract infections and also LUTS the caving something lower urinary tract going on.      Jamie Parsons MD  08/04/19  11:10 AM

## 2019-08-04 NOTE — PROGRESS NOTES
[facility] Medicine Services  INPATIENT PROGRESS NOTE    Length of Stay: 4  Date of Admission: 7/31/2019  Primary Care Physician: Jacklyn Reulas MD    Subjective   Please note that all previous progress notes, lab findings, radiographical findings, medication changes, and physical exam findings have been noted and updated as appropriate.    8/4/2019: Patient continues to demonstrate intermittent rate control with digoxin regimen.  Mendosa catheter removal successful.  He has been urinating, though he complains of feelings of incomplete emptying.  Flomax has been added to his Proscar as well as Pyridium.  He states the dysuria is some better.  No fever, chills, chest pain, shortness of breath.    8/3/2019: Patient heart rate still intermittently controlled, cardiology has made adjustments to medications.  Has been seen by urology who has ordered removal of Mendosa catheter in the a.m.  Have added Pyridium and Flomax to assist with dysuria and hesitancy.  No nephrolithiasis noted on CT scan, possible recently passed stone.  No other acute findings on CT the abdomen and pelvis.    Chief Complaint/HPI: This 63-year-old  male was transferred to our facility from [facility] secondary to generalized weakness, shortness of air, lower extremity edema, palpitations. Patient has been diagnosed with atrial fibrillation with rapid ventricular response.  Patient started on metoprolol 50 mg tablets every 24, however patient still demonstrates intermittent control.  Will defer to cardiology for further titration of medication.  Patient also has a Mendosa catheter that was placed at [facility].  Patient complains of dysuria and penile pain.  He does have a urine that is positive for hematuria with some bacteria.  Possibly acute hemorrhagic cystitis.  He is on Xarelto for his atrial fibrillation.  Patient does have a history of some type of  "\"necrotizing fasciitis\" in the pelvic area per patient.      Review of Systems   Constitutional: Negative for chills and fever.   Respiratory: Negative for chest tightness and shortness of breath.    Cardiovascular: Positive for leg swelling. Negative for chest pain and palpitations.   Gastrointestinal: Negative for abdominal pain, nausea and vomiting.   Genitourinary: Positive for difficulty urinating, dysuria, penile pain and urgency. Negative for hematuria, scrotal swelling and testicular pain.   Neurological: Negative for dizziness, weakness and light-headedness.   Psychiatric/Behavioral: Negative for confusion.      All pertinent negatives and positives are as above. All other systems have been reviewed and are negative unless otherwise stated.     Objective    Temp:  [96.9 °F (36.1 °C)-97.7 °F (36.5 °C)] 97.3 °F (36.3 °C)  Heart Rate:  [] 101  Resp:  [18] 18  BP: (109-161)/(55-82) 137/82    Physical Exam   Constitutional: He is oriented to person, place, and time. He appears well-developed and well-nourished.   HENT:   Head: Normocephalic and atraumatic.   Eyes: Conjunctivae are normal.   Cardiovascular: Normal rate and regular rhythm.   Pulmonary/Chest: Effort normal and breath sounds normal. No stridor. No respiratory distress.   Abdominal: Soft. Bowel sounds are normal. He exhibits no distension. There is no tenderness.   Musculoskeletal: He exhibits edema.   Edema much improved   Neurological: He is alert and oriented to person, place, and time.   Skin: Skin is warm and dry. Capillary refill takes less than 2 seconds.   Psychiatric: He has a normal mood and affect. His behavior is normal.     Results Review:  I have reviewed the labs, radiology results, and diagnostic studies.    Laboratory Data:   Results from last 7 days   Lab Units 08/04/19  0630 08/02/19  1152 08/01/19  0718   SODIUM mmol/L 141 139 138   POTASSIUM mmol/L 4.4 3.6 4.1   CHLORIDE mmol/L 99 100 103   CO2 mmol/L 28.0 28.0 23.0   BUN " mg/dL 15 16 14   CREATININE mg/dL 0.87 0.89 0.93   GLUCOSE mg/dL 114* 125* 154*   CALCIUM mg/dL 9.7 9.1 8.9   BILIRUBIN mg/dL 0.9 0.8  --    ALK PHOS U/L 63 56  --    ALT (SGPT) U/L 21 18  --    AST (SGOT) U/L 20 14  --    ANION GAP mmol/L 14.0 11.0 12.0     Estimated Creatinine Clearance: 124.3 mL/min (by C-G formula based on SCr of 0.87 mg/dL).          Results from last 7 days   Lab Units 08/04/19  0630 08/02/19  1152 08/01/19  0125   WBC 10*3/mm3 6.70 8.99 16.10*   HEMOGLOBIN g/dL 16.3 14.1 13.2   HEMATOCRIT % 49.9 44.1 40.9   PLATELETS 10*3/mm3 194 168 129*     Results from last 7 days   Lab Units 08/02/19  1152 08/01/19  0152   INR  2.21* 2.77*       Culture Data:   No results found for: BLOODCX  No results found for: URINECX  No results found for: RESPCX  No results found for: WOUNDCX  No results found for: STOOLCX  No components found for: BODYFLD    Radiology Data:   Imaging Results (last 24 hours)     Procedure Component Value Units Date/Time    CT Abdomen Pelvis Without Contrast [[id number]] Collected:  08/03/19 1033     Updated:  08/03/19 1142    Narrative:       PROCEDURE: CT ABDOMEN PELVIS WO CONTRAST    INDICATION: abdominal pain, suspected kidney stones    COMPARISON:  None    TECHNIQUE:      - stone protocol      - contrast:  None     This exam was performed according to our departmental  dose-optimization program, which includes automated exposure  control, adjustment of the mA and/or kV according to patient size  and/or use of iterative reconstruction technique.  DLP is 1929.6    FINDINGS:     - - - CT THORAX (inferior, visualized portions): - - -      - lung bases:  Negative    - pleura:  Negative    - misc:  Unremarkable     - - -CT ABDOMEN: - - -      - Kidney, RIGHT:      - size:  Normal ; contour:  Normal.        - perinephric stranding:  None      - nephrolithiasis:  None        - collecting system: not dilated.      - Ureter, RIGHT:      - caliber:  Normal      - course:  Normal      -  ureterolithiasis:  None      - Kidney, LEFT:      - size:  normal ; contour:  normal.        - perinephric stranding:  none      - nephrolithiasis:  none        - collecting system: not dilated.  Low-attenuation 3.4 x 2.4 x 2.4 cm parapelvic cyst is present     - Ureter, LEFT:      - caliber:  normal      - course:  normal      - ureterolithiasis:  none.  There is minimal periureteral mesenteric stranding distally, of  uncertain etiology.      - Misc:     - solid organs (limited due to the lack of intravenous  contrast):        -The gallbladder is contracted, and not well visualized.     - visualized bowel (limited due to the lack of oral contrast):        - grossly negative     - - -CT Pelvis: - - -      - Bladder:  no gross evidence of pathology.    - Misc:      - solid organs (limited due to the lack of intravenous  contrast):         - Grossly negative       - visualized bowel (limited due to the lack of oral  contrast):         -Scattered colonic diverticuli without radiographic  evidence of diverticulitis. The appendix is visualized and  appears normal  The bladder is decompressed with a catheter.  Left inguinal hernia is present, containing only fat  There appears to be a right hydrocele      Impression:       1. No evidence of nephro / uretero lithiasis or obstructive  uropathy.  2. Minimal mesenteric stranding along the course of the distal  left ureter of uncertain etiology or clinical importance. Recent  passage of calculus is a possibility.  3. Simple appearing left parapelvic cyst  4. Right hydrocele  5. Colonic diverticulosis without radiographic evidence of  diverticulitis.  6. Small left inguinal hernia containing only fat  7. Right hydrocele  8. Please see findings section above for further details    Electronically signed by:  Ely Luz MD  8/3/2019 11:40 AM CDT  Workstation: [id number]          I have reviewed the patient's current medications.     Assessment/Plan     Active Hospital Problems     Diagnosis   • **Chronic atrial fibrillation (CMS/HCC)   • Obstructive sleep apnea   • Hematuria   • Leg edema   • Morbid obesity (CMS/HCC)   • BPH (benign prostatic hyperplasia)   • Hypertension       Plan: Continue bladder scans, continue urological medications, cardiology consult appreciated, suspect patient can be changed to p.o. diuretics within 24 hours.            This document has been electronically signed by PRINCE Castellon on August 4, 2019 10:38 AM

## 2019-08-05 VITALS
SYSTOLIC BLOOD PRESSURE: 118 MMHG | RESPIRATION RATE: 18 BRPM | HEIGHT: 70 IN | TEMPERATURE: 97.6 F | OXYGEN SATURATION: 95 % | HEART RATE: 92 BPM | DIASTOLIC BLOOD PRESSURE: 68 MMHG | BODY MASS INDEX: 44.03 KG/M2 | WEIGHT: 307.6 LBS

## 2019-08-05 PROCEDURE — 99232 SBSQ HOSP IP/OBS MODERATE 35: CPT | Performed by: NURSE PRACTITIONER

## 2019-08-05 PROCEDURE — 25010000002 CEFTRIAXONE PER 250 MG: Performed by: PHYSICIAN ASSISTANT

## 2019-08-05 PROCEDURE — 25010000002 FUROSEMIDE PER 20 MG: Performed by: PHYSICIAN ASSISTANT

## 2019-08-05 RX ORDER — DIGOXIN 125 MCG
125 TABLET ORAL
Qty: 30 TABLET | Refills: 1 | Status: SHIPPED | OUTPATIENT
Start: 2019-08-05

## 2019-08-05 RX ORDER — METOPROLOL SUCCINATE 100 MG/1
100 TABLET, EXTENDED RELEASE ORAL
Qty: 30 TABLET | Refills: 1 | Status: SHIPPED | OUTPATIENT
Start: 2019-08-06

## 2019-08-05 RX ORDER — METOPROLOL SUCCINATE 50 MG/1
50 TABLET, EXTENDED RELEASE ORAL ONCE
Status: COMPLETED | OUTPATIENT
Start: 2019-08-05 | End: 2019-08-05

## 2019-08-05 RX ORDER — PHENAZOPYRIDINE HYDROCHLORIDE 100 MG/1
100 TABLET, FILM COATED ORAL
Qty: 30 TABLET | Refills: 1 | Status: SHIPPED | OUTPATIENT
Start: 2019-08-05

## 2019-08-05 RX ORDER — TAMSULOSIN HYDROCHLORIDE 0.4 MG/1
0.4 CAPSULE ORAL DAILY
Qty: 30 CAPSULE | Refills: 1 | Status: SHIPPED | OUTPATIENT
Start: 2019-08-06

## 2019-08-05 RX ORDER — METOPROLOL SUCCINATE 100 MG/1
100 TABLET, EXTENDED RELEASE ORAL
Status: DISCONTINUED | OUTPATIENT
Start: 2019-08-06 | End: 2019-08-05 | Stop reason: HOSPADM

## 2019-08-05 RX ORDER — CEFDINIR 300 MG/1
300 CAPSULE ORAL 2 TIMES DAILY
Qty: 20 CAPSULE | Refills: 0 | Status: SHIPPED | OUTPATIENT
Start: 2019-08-05

## 2019-08-05 RX ORDER — FUROSEMIDE 40 MG/1
40 TABLET ORAL DAILY
Status: DISCONTINUED | OUTPATIENT
Start: 2019-08-05 | End: 2019-08-05 | Stop reason: HOSPADM

## 2019-08-05 RX ORDER — FINASTERIDE 5 MG/1
5 TABLET, FILM COATED ORAL DAILY
Qty: 30 TABLET | Refills: 1 | Status: SHIPPED | OUTPATIENT
Start: 2019-08-06

## 2019-08-05 RX ADMIN — FINASTERIDE 5 MG: 5 TABLET, FILM COATED ORAL at 08:40

## 2019-08-05 RX ADMIN — DIGOXIN 125 MCG: 125 TABLET ORAL at 11:25

## 2019-08-05 RX ADMIN — PHENAZOPYRIDINE HYDROCHLORIDE 100 MG: 100 TABLET ORAL at 08:40

## 2019-08-05 RX ADMIN — TAMSULOSIN HYDROCHLORIDE 0.4 MG: 0.4 CAPSULE ORAL at 08:40

## 2019-08-05 RX ADMIN — RIVAROXABAN 20 MG: 10 TABLET, FILM COATED ORAL at 09:54

## 2019-08-05 RX ADMIN — METOPROLOL SUCCINATE 50 MG: 50 TABLET, EXTENDED RELEASE ORAL at 09:54

## 2019-08-05 RX ADMIN — PHENAZOPYRIDINE HYDROCHLORIDE 100 MG: 100 TABLET ORAL at 11:28

## 2019-08-05 RX ADMIN — METOPROLOL SUCCINATE 50 MG: 50 TABLET, EXTENDED RELEASE ORAL at 08:40

## 2019-08-05 RX ADMIN — FUROSEMIDE 40 MG: 10 INJECTION, SOLUTION INTRAMUSCULAR; INTRAVENOUS at 08:40

## 2019-08-05 RX ADMIN — CEFTRIAXONE SODIUM 1 G: 1 INJECTION, POWDER, FOR SOLUTION INTRAMUSCULAR; INTRAVENOUS at 11:25

## 2019-08-05 RX ADMIN — LISINOPRIL 5 MG: 5 TABLET ORAL at 08:40

## 2019-08-05 RX ADMIN — SODIUM CHLORIDE, PRESERVATIVE FREE 10 ML: 5 INJECTION INTRAVENOUS at 08:41

## 2019-08-05 NOTE — DISCHARGE SUMMARY
[facility]  DISCHARGE SUMMARY       Date of Admission: 7/31/2019  Date of Discharge:  8/5/2019  Primary Care Physician: Jacklyn Ruelas MD    Presenting Problem/History of Present Illness:  A-fib (CMS/LTAC, located within [facility]) [I48.91]     Final Discharge Diagnoses:  Active Hospital Problems    Diagnosis   • **Chronic atrial fibrillation (CMS/LTAC, located within [facility])   • Obstructive sleep apnea   • Hematuria   • Leg edema   • Morbid obesity (CMS/LTAC, located within [facility])   • BPH (benign prostatic hyperplasia)   • Hypertension       Consults:   Consults     Date and Time Order Name Status Description    8/2/2019 1032 Inpatient Urology Consult Completed     8/1/2019 1157 Inpatient Cardiology Consult Completed         Pertinent Test Results:   Lab Results (most recent)     Procedure Component Value Units Date/Time    Comprehensive Metabolic Panel [[id number]]  (Abnormal) Collected:  08/04/19 0630    Specimen:  Blood Updated:  08/04/19 0658     Glucose 114 mg/dL      BUN 15 mg/dL      Creatinine 0.87 mg/dL      Sodium 141 mmol/L      Potassium 4.4 mmol/L      Chloride 99 mmol/L      CO2 28.0 mmol/L      Calcium 9.7 mg/dL      Total Protein 7.6 g/dL      Albumin 4.00 g/dL      ALT (SGPT) 21 U/L      AST (SGOT) 20 U/L      Alkaline Phosphatase 63 U/L      Total Bilirubin 0.9 mg/dL      eGFR Non African Amer 89 mL/min/1.73      Globulin 3.6 gm/dL      A/G Ratio 1.1 g/dL      BUN/Creatinine Ratio 17.2     Anion Gap 14.0 mmol/L     Narrative:       GFR Normal >60  Chronic Kidney Disease <60  Kidney Failure <15    CBC & Differential [[id number]] Collected:  08/04/19 0630    Specimen:  Blood Updated:  08/04/19 0653    Narrative:       The following orders were created for panel order CBC & Differential.  Procedure                               Abnormality         Status                     ---------                               -----------         ------                     CBC Auto Differential[[id number]]        Abnormal             Final result                 Please view results for these tests on the individual orders.    CBC Auto Differential [[id number]]  (Abnormal) Collected:  08/04/19 0630    Specimen:  Blood Updated:  08/04/19 0653     WBC 6.70 10*3/mm3      RBC 5.43 10*6/mm3      Hemoglobin 16.3 g/dL      Hematocrit 49.9 %      MCV 91.9 fL      MCH 30.0 pg      MCHC 32.7 g/dL      RDW 14.5 %      RDW-SD 48.7 fl      MPV 10.8 fL      Platelets 194 10*3/mm3      Neutrophil % 58.7 %      Lymphocyte % 23.9 %      Monocyte % 13.7 %      Eosinophil % 2.1 %      Basophil % 0.9 %      Immature Grans % 0.7 %      Neutrophils, Absolute 3.93 10*3/mm3      Lymphocytes, Absolute 1.60 10*3/mm3      Monocytes, Absolute 0.92 10*3/mm3      Eosinophils, Absolute 0.14 10*3/mm3      Basophils, Absolute 0.06 10*3/mm3      Immature Grans, Absolute 0.05 10*3/mm3      nRBC 0.0 /100 WBC     Urine Culture - Urine, Urine, Catheter [[id number]]  (Normal) Collected:  08/01/19 0955    Specimen:  Urine, Catheter Updated:  08/02/19 1443     Urine Culture No growth    Comprehensive Metabolic Panel [[id number]]  (Abnormal) Collected:  08/02/19 1152    Specimen:  Blood Updated:  08/02/19 1252     Glucose 125 mg/dL      BUN 16 mg/dL      Creatinine 0.89 mg/dL      Sodium 139 mmol/L      Potassium 3.6 mmol/L      Chloride 100 mmol/L      CO2 28.0 mmol/L      Calcium 9.1 mg/dL      Total Protein 7.0 g/dL      Albumin 3.80 g/dL      ALT (SGPT) 18 U/L      AST (SGOT) 14 U/L      Alkaline Phosphatase 56 U/L      Total Bilirubin 0.8 mg/dL      eGFR Non African Amer 87 mL/min/1.73      Globulin 3.2 gm/dL      A/G Ratio 1.2 g/dL      BUN/Creatinine Ratio 18.0     Anion Gap 11.0 mmol/L     Narrative:       GFR Normal >60  Chronic Kidney Disease <60  Kidney Failure <15    Urinalysis, Microscopic Only - Urine, Catheter [[id number]]  (Abnormal) Collected:  08/02/19 1217    Specimen:  Urine, Catheter Updated:  08/02/19 1242     RBC, UA 31-50 /HPF      WBC, UA 3-5 /HPF      Bacteria,  UA None Seen /HPF      Squamous Epithelial Cells, UA None Seen /HPF      Hyaline Casts, UA 0-2 /LPF      Methodology Automated Microscopy    Urinalysis With Culture If Indicated - Urine, Catheter [[id number]]  (Abnormal) Collected:  08/02/19 1217    Specimen:  Urine, Catheter Updated:  08/02/19 1241     Color, UA Yellow     Appearance, UA Clear     pH, UA 6.0     Specific Gravity, UA 1.011     Glucose, UA Negative     Ketones, UA Negative     Bilirubin, UA Negative     Blood, UA Large (3+)     Protein, UA Negative     Leuk Esterase, UA Trace     Nitrite, UA Negative     Urobilinogen, UA 1.0 E.U./dL    Protime-INR [[id number]]  (Abnormal) Collected:  08/02/19 1152    Specimen:  Blood Updated:  08/02/19 1230     Protime 24.3 Seconds      INR 2.21    Narrative:       Therapeutic range for most indications is 2.0-3.0 INR,  or 2.5-3.5 for mechanical heart valves.    CBC & Differential [[id number]] Collected:  08/02/19 1152    Specimen:  Blood Updated:  08/02/19 1206    Narrative:       The following orders were created for panel order CBC & Differential.  Procedure                               Abnormality         Status                     ---------                               -----------         ------                     CBC Auto Differential[[id number]]        Abnormal            Final result                 Please view results for these tests on the individual orders.    CBC Auto Differential [[id number]]  (Abnormal) Collected:  08/02/19 1152    Specimen:  Blood Updated:  08/02/19 1206     WBC 8.99 10*3/mm3      RBC 4.81 10*6/mm3      Hemoglobin 14.1 g/dL      Hematocrit 44.1 %      MCV 91.7 fL      MCH 29.3 pg      MCHC 32.0 g/dL      RDW 14.6 %      RDW-SD 49.1 fl      MPV 11.3 fL      Platelets 168 10*3/mm3      Neutrophil % 82.9 %      Lymphocyte % 9.0 %      Monocyte % 6.1 %      Eosinophil % 1.2 %      Basophil % 0.4 %      Immature Grans % 0.4 %      Neutrophils, Absolute 7.44 10*3/mm3      Lymphocytes, Absolute  0.81 10*3/mm3      Monocytes, Absolute 0.55 10*3/mm3      Eosinophils, Absolute 0.11 10*3/mm3      Basophils, Absolute 0.04 10*3/mm3      Immature Grans, Absolute 0.04 10*3/mm3      nRBC 0.0 /100 WBC     BNP [[id number]]  (Abnormal) Collected:  08/01/19 1236    Specimen:  Blood Updated:  08/01/19 1343     proBNP 1,000.0 pg/mL     Narrative:       Among patients with dyspnea, NT-proBNP is highly sensitive for the detection of acute congestive heart failure. In addition NT-proBNP of <300 pg/ml effectively rules out acute congestive heart failure with 99% negative predictive value.    Troponin [[id number]]  (Normal) Collected:  08/01/19 1236    Specimen:  Blood Updated:  08/01/19 1312     Troponin T <0.010 ng/mL     Narrative:       Troponin T Reference Range:  <= 0.03 ng/mL-   Negative for AMI  >0.03 ng/mL-     Abnormal for myocardial necrosis.  Clinicians would have to utilize clinical acumen, EKG, Troponin and serial changes to determine if it is an Acute Myocardial Infarction or myocardial injury due to an underlying chronic condition.     Urinalysis With Culture If Indicated - Urine, Catheter [[id number]]  (Abnormal) Collected:  08/01/19 0955    Specimen:  Urine, Catheter Updated:  08/01/19 1018     Color, UA Dark Yellow     Appearance, UA Clear     pH, UA 5.5     Specific [place], UA 1.066     Comment: Result obtained by Refractometer        Glucose, UA Negative     Ketones, UA Negative     Bilirubin, UA Small (1+)     Blood, UA Large (3+)     Protein,  mg/dL (2+)     Leuk Esterase, UA Trace     Nitrite, UA Negative     Urobilinogen, UA 1.0 E.U./dL    Urinalysis, Microscopic Only - Urine, Catheter [[id number]]  (Abnormal) Collected:  08/01/19 0955    Specimen:  Urine, Catheter Updated:  08/01/19 1015     RBC, UA Too Numerous to Count /HPF      WBC, UA 13-20 /HPF      Bacteria, UA None Seen /HPF      Squamous Epithelial Cells, UA 3-5 /HPF      Hyaline Casts, UA 13-20 /LPF      Methodology Automated Microscopy     Troponin [[id number]]  (Normal) Collected:  08/01/19 0718    Specimen:  Blood Updated:  08/01/19 0807     Troponin T <0.010 ng/mL     Narrative:       Troponin T Reference Range:  <= 0.03 ng/mL-   Negative for AMI  >0.03 ng/mL-     Abnormal for myocardial necrosis.  Clinicians would have to utilize clinical acumen, EKG, Troponin and serial changes to determine if it is an Acute Myocardial Infarction or myocardial injury due to an underlying chronic condition.     Basic Metabolic Panel [[id number]]  (Abnormal) Collected:  08/01/19 0718    Specimen:  Blood Updated:  08/01/19 0805     Glucose 154 mg/dL      BUN 14 mg/dL      Creatinine 0.93 mg/dL      Sodium 138 mmol/L      Potassium 4.1 mmol/L      Chloride 103 mmol/L      CO2 23.0 mmol/L      Calcium 8.9 mg/dL      eGFR Non African Amer 82 mL/min/1.73      BUN/Creatinine Ratio 15.1     Anion Gap 12.0 mmol/L     Narrative:       GFR Normal >60  Chronic Kidney Disease <60  Kidney Failure <15    Scan Slide [[id number]] Collected:  08/01/19 0125    Specimen:  Blood Updated:  08/01/19 0400     RBC Morphology Normal     WBC Morphology Normal     Platelet Estimate Decreased    Protime-INR [[id number]]  (Abnormal) Collected:  08/01/19 0152    Specimen:  Blood Updated:  08/01/19 0301     Protime 29.0 Seconds      INR 2.77    Narrative:       Therapeutic range for most indications is 2.0-3.0 INR,  or 2.5-3.5 for mechanical heart valves.    aPTT [[id number]]  (Normal) Collected:  08/01/19 0152    Specimen:  Blood Updated:  08/01/19 0300     PTT 39.3 seconds     Narrative:       The recommended Heparin therapeutic range is 68-97 seconds.    Basic Metabolic Panel [[id number]]  (Abnormal) Collected:  08/01/19 0125    Specimen:  Blood Updated:  08/01/19 0202     Glucose 153 mg/dL      BUN 12 mg/dL      Creatinine 1.02 mg/dL      Sodium 138 mmol/L      Potassium 4.1 mmol/L      Chloride 101 mmol/L      CO2 24.0 mmol/L      Calcium 9.0 mg/dL      eGFR Non African Amer 74 mL/min/1.73       BUN/Creatinine Ratio 11.8     Anion Gap 13.0 mmol/L     Narrative:       GFR Normal >60  Chronic Kidney Disease <60  Kidney Failure <15    Blood Gas, Arterial [[id number]] Collected:  08/01/19 0128    Specimen:  Arterial Blood Updated:  08/01/19 0134     Site Left Radial     Mando's Test Positive     pH, Arterial 7.445 pH units      pCO2, Arterial 35.6 mm Hg      pO2, Arterial 87.9 mm Hg      HCO3, Arterial 24.4 mmol/L      Base Excess, Arterial 0.7 mmol/L      O2 Saturation, Arterial 97.3 %      Barometric Pressure for Blood Gas 749 mmHg      Modality Nasal Cannula     Flow Rate 2.0 lpm      Ventilator Mode NA     Collected by TS     Comment: Meter: [id number]     :  [id number]           Imaging Results (most recent)     Procedure Component Value Units Date/Time    CT Abdomen Pelvis Without Contrast [[id number]] Collected:  08/03/19 1033     Updated:  08/03/19 1142    Narrative:       PROCEDURE: CT ABDOMEN PELVIS WO CONTRAST    INDICATION: abdominal pain, suspected kidney stones    COMPARISON:  None    TECHNIQUE:      - stone protocol      - contrast:  None     This exam was performed according to our departmental  dose-optimization program, which includes automated exposure  control, adjustment of the mA and/or kV according to patient size  and/or use of iterative reconstruction technique.  DLP is 1929.6    FINDINGS:     - - - CT THORAX (inferior, visualized portions): - - -      - lung bases:  Negative    - pleura:  Negative    - misc:  Unremarkable     - - -CT ABDOMEN: - - -      - Kidney, RIGHT:      - size:  Normal ; contour:  Normal.        - perinephric stranding:  None      - nephrolithiasis:  None        - collecting system: not dilated.      - Ureter, RIGHT:      - caliber:  Normal      - course:  Normal      - ureterolithiasis:  None      - Kidney, LEFT:      - size:  normal ; contour:  normal.        - perinephric stranding:  none      - nephrolithiasis:  none        - collecting system: not  dilated.  Low-attenuation 3.4 x 2.4 x 2.4 cm parapelvic cyst is present     - Ureter, LEFT:      - caliber:  normal      - course:  normal      - ureterolithiasis:  none.  There is minimal periureteral mesenteric stranding distally, of  uncertain etiology.      - Misc:     - solid organs (limited due to the lack of intravenous  contrast):        -The gallbladder is contracted, and not well visualized.     - visualized bowel (limited due to the lack of oral contrast):        - grossly negative     - - -CT Pelvis: - - -      - Bladder:  no gross evidence of pathology.    - Misc:      - solid organs (limited due to the lack of intravenous  contrast):         - Grossly negative       - visualized bowel (limited due to the lack of oral  contrast):         -Scattered colonic diverticuli without radiographic  evidence of diverticulitis. The appendix is visualized and  appears normal  The bladder is decompressed with a catheter.  Left inguinal hernia is present, containing only fat  There appears to be a right hydrocele      Impression:       1. No evidence of nephro / uretero lithiasis or obstructive  uropathy.  2. Minimal mesenteric stranding along the course of the distal  left ureter of uncertain etiology or clinical importance. Recent  passage of calculus is a possibility.  3. Simple appearing left parapelvic cyst  4. Right hydrocele  5. Colonic diverticulosis without radiographic evidence of  diverticulitis.  6. Small left inguinal hernia containing only fat  7. Right hydrocele  8. Please see findings section above for further details    Electronically signed by:  Ely Luz MD  8/3/2019 11:40 AM CDT  Workstation: [id number]    US Venous Doppler Lower Extremity Bilateral (duplex) [[id number]] Collected:  08/01/19 0836     Updated:  08/01/19 0941    Narrative:         Ultrasound venous duplex bilateral lower extremities    HISTORY: Shortness of breath. Lower extremity pain.    Duplex ultrasound of the deep venous system  of each lower  extremity was performed    FINDINGS:  Real-time images demonstrate normal compressibility without  evidence of intraluminal thrombus.  Doppler shows phasic flow and augmentation.  Color Doppler also reveals venous patency.    6.02 cm x 3.01 cm x 1.79 cm left popliteal or Baker's cyst with  some internal debris or hemorrhage.      Impression:       CONCLUSION:  No ultrasound evidence of deep venous thrombosis of either lower  extremity.  6.02 cm x 3.01 cm x 1.79 cm left popliteal or Baker's cyst with  some internal debris or hemorrhage.    [id number]    Electronically signed by:  Jose Raul Stephenson MD  8/1/2019 9:40 AM CDT  Workstation: [id number]    CT Angiogram Chest With Contrast [[id number]] Collected:  08/01/19 0233     Updated:  08/01/19 0324    Narrative:         CT angiogram chest with contrast on 8/1/2019     CLINICAL INDICATION: Shortness of breath    TECHNIQUE: Multiple axial images are obtained throughout the  chest following the administration of IV contrast.  Computer  generated 3D reconstructions/MIPS were performed. This exam was  performed according to our departmental dose-optimization  program, which includes automated exposure control, adjustment of  the mA and/or kV according to patient size and/or use of  iterative reconstruction technique.   Total DLP is 14516.9 mGy*cm.    COMPARISON: None     FINDINGS: There is mild elevation of the right hemidiaphragm.  There is no pleural or pericardial effusion. Limited visualized  upper abdomen is unremarkable. Breathing motion limits some of  the examination. There are no filling defects within the  pulmonary arteries to suggest pulmonary embolus. There is no  thoracic adenopathy. There is minimal bilateral dependent  atelectasis. Lungs are otherwise clear. Degenerative changes are  noted in the spine.      Impression:       1. No evidence of pulmonary embolus.  2. No acute abnormality.    Electronically signed by:  Adama Urias  8/1/2019 3:23 AM  CDT  Workstation: [id number]    XR Chest 1 View [[id number]] Collected:  08/01/19 0153     Updated:  08/01/19 0234    Narrative:         Chest single view on  8/1/2019     CLINICAL INDICATION: Shortness of breath    COMPARISON: None    FINDINGS: There is elevation of the right hemidiaphragm. Mild  cardiomegaly is noted. The lungs are clear. Hilar and mediastinal  contours are within normal limits. Pulmonary vascularity is  within normal limits.      Impression:       No acute disease.    Electronically signed by:  Adama Urias  8/1/2019 2:33 AM CDT  Workstation: [id number]        Hospital Course:  The patient is a 63 y.o. male who presented to [facility] with generalized weakness, shortness of air, lower extremity edema, palpitations.  Patient was diagnosed with atrial fibrillation with rapid ventricular response.  He was also diagnosed with congestive heart failure with reduced ejection fraction of 41 to 45%.  He was diuresed heavily and lost over 20 pounds during his hospital stay.  His medications were titrated multiple times and he is currently on metoprolol XL at 100 mg daily as well as digoxin 125 mcg daily.  Cardiology reported that the patient could go home at his current heart rate and follow-up with Dr. Hutchinson in the office for a possible future ablation.  Patient is completely asymptomatic with no shortness of breath, no dizziness, no syncope, no chest pain, no palpitations.  Also secondary to hematuria and dysuria urology evaluated the patient.  He was also treated for a urinary tract infection with ceftriaxone.  Urology determined that patient could undergo cystoscopy as an outpatient.  He also determined that the patient would not have to stop his Xarelto to undergo cystoscopy.  Patient stated he felt much better and was extremely happy with the improvement in his edema.  He had no complaints.  His dysuria was also nearly resolved at the time of discharge.  He was urinating without  "issue.  He was instructed to return with any concerning worsening symptoms.  He verbalized understanding.  He will be referred to cardiology, urology, and his PCP.    Condition on Discharge: Stable, improved    Physical Exam on Discharge:  /85 (BP Location: Right arm, Patient Position: Lying)   Pulse 120   Temp 97.8 °F (36.6 °C) (Temporal)   Resp 18   Ht 177.8 cm (70\")   Wt (!) 140 kg (307 lb 9.6 oz)   SpO2 96%   BMI 44.14 kg/m²   Physical Exam   Constitutional: He is oriented to person, place, and time. He appears well-developed and well-nourished.   HENT:   Head: Normocephalic and atraumatic.   Eyes: Conjunctivae are normal.   Cardiovascular:   Irregularly irregular with intermittent control   Pulmonary/Chest: Effort normal and breath sounds normal. No stridor. No respiratory distress.   Abdominal: Soft. Bowel sounds are normal.   Musculoskeletal: He exhibits edema.   Edema much improved   Neurological: He is alert and oriented to person, place, and time.   Skin: Skin is warm and dry. Capillary refill takes less than 2 seconds.   Psychiatric: He has a normal mood and affect. His behavior is normal.     Discharge Disposition:  Home or Self Care    Discharge Medications:     Discharge Medications      New Medications      Instructions Start Date   cefdinir 300 MG capsule  Commonly known as:  OMNICEF   300 mg, Oral, 2 Times Daily      digoxin 125 MCG tablet  Commonly known as:  LANOXIN   125 mcg, Oral, Daily Digoxin      finasteride 5 MG tablet  Commonly known as:  PROSCAR   5 mg, Oral, Daily   Start Date:  8/6/2019     metoprolol succinate  MG 24 hr tablet  Commonly known as:  TOPROL-XL   100 mg, Oral, Every 24 Hours Scheduled   Start Date:  8/6/2019     phenazopyridine 100 MG tablet  Commonly known as:  PYRIDIUM   100 mg, Oral, 3 Times Daily With Meals      tamsulosin 0.4 MG capsule 24 hr capsule  Commonly known as:  FLOMAX   0.4 mg, Oral, Daily   Start Date:  8/6/2019        Continue These " Medications      Instructions Start Date   aspirin 81 MG EC tablet   81 mg, Oral, Daily      budesonide-formoterol 160-4.5 MCG/ACT inhaler  Commonly known as:  SYMBICORT   2 puffs, Inhalation, 2 Times Daily - RT      diclofenac 1 % gel gel  Commonly known as:  VOLTAREN   4 g, Topical, 4 Times Daily PRN      docusate sodium 100 MG capsule  Commonly known as:  COLACE   100 mg, Oral, Daily PRN      furosemide 40 MG tablet  Commonly known as:  LASIX   40 mg, Oral, Daily      lisinopril 5 MG tablet  Commonly known as:  PRINIVIL,ZESTRIL   5 mg, Oral, Daily      potassium chloride 10 MEQ CR tablet  Commonly known as:  K-DUR,KLOR-CON   10 mEq, Oral, Daily      Red Yeast Rice 600 MG tablet   1 tablet, Oral, 2 Times Daily      rivaroxaban 20 MG tablet  Commonly known as:  XARELTO   20 mg, Oral, Daily      vitamin D3 5000 units capsule capsule   5,000 Units, Oral, Daily      Zinc 50 MG tablet   1 tablet, Oral, Daily         Stop These Medications    carvedilol 6.25 MG tablet  Commonly known as:  COREG     diclofenac 75 MG EC tablet  Commonly known as:  VOLTAREN            Discharge Diet:   Diet Instructions     Diet: Cardiac      Discharge Diet:  Cardiac    Fluid Restriction per day:  Other (See comments)    2000 mL fluid restriction daily and 2,000 mg sodium daily          Activity at Discharge:   Activity Instructions     Other Activity Instructions      Activity Instructions:  No strenuous activity until cleared by cardiology          Discharge Care Plan/Instructions: Medications as prescribed, continue antibiotic, continue urology regimen, follow-up with PCP, follow-up with urology, follow-up with cardiology.  Return with any concerning worsening symptoms.        This document has been electronically signed by PRINCE Castellon on August 5, 2019 11:23 AM      Time: Please note the discharge planning and summary exceeded 30 minutes

## 2019-08-05 NOTE — PROGRESS NOTES
"[facility] Cardiology Progress Note   LOS: 5 days   Patient Care Team:  Jacklyn Ruelas MD as PCP - General (Internal Medicine)    Chief Complaint: dyspnea     Subjective     Interval History:   Patient Denies: shortness of air, chest pain, orthopnea and syncope  Patient Complaints: edema  History taken from: patient    Adjusting rate control medications. Doing well. Increased rate with activity. Asymptomatic. Weight down 20lbs.     Objective     Vital Sign Min/Max for last 24 hours  Temp  Min: 97.1 °F (36.2 °C)  Max: 97.8 °F (36.6 °C)   BP  Min: 104/76  Max: 140/70   Pulse  Min: 67  Max: 152   Resp  Min: 18  Max: 18   SpO2  Min: 95 %  Max: 96 %   No Data Recorded   Weight  Min: 140 kg (307 lb 9.6 oz)  Max: 140 kg (307 lb 9.6 oz)     Flowsheet Rows      First Filed Value   Admission Height  177.8 cm (70\") Documented at 07/31/2019 1843   Admission Weight  149 kg (327 lb 12.8 oz)  (Abnormal)  Documented at 07/31/2019 1903            08/03/19  0457 08/04/19  0600 08/05/19  0644   Weight: (!) 144 kg (318 lb 6.4 oz) (!) 140 kg (309 lb 9.6 oz) (!) 140 kg (307 lb 9.6 oz)       Physical Exam:  Physical Exam   Constitutional: He is oriented to person, place, and time. No distress.   Morbidly obese    HENT:   Head: Normocephalic and atraumatic.   Eyes: EOM are normal. Pupils are equal, round, and reactive to light. No scleral icterus.   Neck: Normal range of motion. Neck supple.   Cardiovascular: Normal rate and regular rhythm.   Pulmonary/Chest: Effort normal and breath sounds normal. No stridor. No respiratory distress.   Abdominal: Soft. Bowel sounds are normal. He exhibits no distension and no mass. There is no tenderness.   Musculoskeletal: He exhibits edema (1+). He exhibits no tenderness.   Neurological: He is alert and oriented to person, place, and time. He displays normal reflexes. No cranial nerve deficit. Coordination normal.   Skin: Skin is warm and dry. No pallor.   Psychiatric: He has a normal mood and affect. His " behavior is normal.   Vitals reviewed.       Results Review:     Results from last 7 days   Lab Units 08/04/19  0630 08/02/19  1152 08/01/19  0718   SODIUM mmol/L 141 139 138   POTASSIUM mmol/L 4.4 3.6 4.1   CHLORIDE mmol/L 99 100 103   CO2 mmol/L 28.0 28.0 23.0   BUN mg/dL 15 16 14   CREATININE mg/dL 0.87 0.89 0.93   CALCIUM mg/dL 9.7 9.1 8.9   BILIRUBIN mg/dL 0.9 0.8  --    ALK PHOS U/L 63 56  --    ALT (SGPT) U/L 21 18  --    AST (SGOT) U/L 20 14  --    GLUCOSE mg/dL 114* 125* 154*     Estimated Creatinine Clearance: 124.3 mL/min (by C-G formula based on SCr of 0.87 mg/dL).      Results from last 7 days   Lab Units 08/04/19  0630 08/02/19  1152 08/01/19  0125   WBC 10*3/mm3 6.70 8.99 16.10*   HEMOGLOBIN g/dL 16.3 14.1 13.2   PLATELETS 10*3/mm3 194 168 129*       Results from last 7 days   Lab Units 08/02/19  1152 08/01/19  0152   INR  2.21* 2.77*     Lab Results   Component Value Date    PROBNP 1,000.0 (H) 08/01/2019       I/O last 3 completed shifts:  In: 1320 [P.O.:1320]  Out: 6473 [Urine:6473]    Cardiographics:    Results for orders placed during the hospital encounter of 07/31/19   Adult Transthoracic Echo Complete W/ Cont if Necessary Per Protocol    Narrative · The study is technically difficult for diagnosis.  · Definity contrast in order to optimize the study  · The left ventricular cavity is moderately dilated.  · Left ventricular wall thickness is consistent with mild concentric   hypertrophy.  · Right ventricular cavity is mild-to-moderately dilated.  · Mildly reduced right ventricular systolic function noted.  · Mild tricuspid valve regurgitation is present.  · Left atrial cavity size is moderate-to-severely dilated.  · Estimated EF appears to be in the range of 41 - 45%.          Ct Abdomen Pelvis Without Contrast    Result Date: 8/3/2019  1. No evidence of nephro / uretero lithiasis or obstructive uropathy. 2. Minimal mesenteric stranding along the course of the distal left ureter of uncertain  etiology or clinical importance. Recent passage of calculus is a possibility. 3. Simple appearing left parapelvic cyst 4. Right hydrocele 5. Colonic diverticulosis without radiographic evidence of diverticulitis. 6. Small left inguinal hernia containing only fat 7. Right hydrocele 8. Please see findings section above for further details Electronically signed by:  Ely Luz MD  8/3/2019 11:40 AM CDT Workstation: [id number]    Xr Chest 1 View    Result Date: 8/1/2019  No acute disease. Electronically signed by:  Adama Urias  8/1/2019 2:33 AM CDT Workstation: [id number]    Us Venous Doppler Lower Extremity Bilateral (duplex)    Result Date: 8/1/2019  CONCLUSION: No ultrasound evidence of deep venous thrombosis of either lower extremity. 6.02 cm x 3.01 cm x 1.79 cm left popliteal or Baker's cyst with some internal debris or hemorrhage. [id number] Electronically signed by:  Jose Raul Stephenson MD  8/1/2019 9:40 AM CDT Workstation: [id number]    Ct Angiogram Chest With Contrast    Result Date: 8/1/2019  1. No evidence of pulmonary embolus. 2. No acute abnormality. Electronically signed by:  Adama Urias  8/1/2019 3:23 AM CDT Workstation: [id number]      Medication Review:     Current Facility-Administered Medications:   •  cefTRIAXone (ROCEPHIN) 1 g/100 mL 0.9% NS (MBP), 1 g, Intravenous, Q24H, Darshan Kennedy PA, 1 g at 08/04/19 1111  •  digoxin (LANOXIN) tablet 125 mcg, 125 mcg, Oral, Daily, Frank Wyatt MD PhD, 125 mcg at 08/04/19 1111  •  finasteride (PROSCAR) tablet 5 mg, 5 mg, Oral, Daily, Keon Nielson MD, 5 mg at 08/05/19 0840  •  furosemide (LASIX) injection 40 mg, 40 mg, Intravenous, Q12H, Darshan Kennedy PA, 40 mg at 08/05/19 0840  •  HYDROcodone-acetaminophen (NORCO) 7.5-325 MG per tablet 1 tablet, 1 tablet, Oral, Q4H PRN, Keon Nielson MD  •  lisinopril (PRINIVIL,ZESTRIL) tablet 5 mg, 5 mg, Oral, Q24H, Sydney Blunt APRN, 5 mg at 08/05/19 0840  •  [START ON 8/6/2019] metoprolol succinate XL  (TOPROL-XL) 24 hr tablet 100 mg, 100 mg, Oral, Q24H, Sydney Blunt APRN  •  phenazopyridine (PYRIDIUM) tablet 100 mg, 100 mg, Oral, TID With Meals, Darshan Kennedy PA, 100 mg at 08/05/19 0840  •  rivaroxaban (XARELTO) tablet 20 mg, 20 mg, Oral, Daily, Darshan Kennedy PA, 20 mg at 08/05/19 0954  •  sodium chloride 0.9 % flush 3 mL, 3 mL, Intravenous, Q12H, Keon Nielson MD, 10 mL at 08/05/19 0841  •  sodium chloride 0.9 % flush 3-10 mL, 3-10 mL, Intravenous, PRN, Keon Nielson MD, 10 mL at 08/01/19 1219  •  tamsulosin (FLOMAX) 24 hr capsule 0.4 mg, 0.4 mg, Oral, Daily, Darshan Kennedy PA, 0.4 mg at 08/05/19 0840    Assessment/Plan       Chronic atrial fibrillation (CMS/HCC)    Hypertension    Morbid obesity (CMS/HCC)    BPH (benign prostatic hyperplasia)    Obstructive sleep apnea    Hematuria    Leg edema    Chronic atrial fibrillation (CMS/HCC)  Paroxysmal atrial fibrillation chronic - 1 year or more  CHADSVASC: CHF, HTN and DM  EF is 41-45%. mild evidence for LVH. LA size is enlarged.      Rate is fairly controlled at this time, with increasing rate during activity. Asymptomatic.    Recommend OP appointment with Dr. Hutchinson- may need Cardioversion following 4 weeks of anticoagulation.      Anticoagulation:ordered  - Xarelto 20mg daily      Rate control agents:ordered  - Toprol XL increase to 100mg. Additional 50mg today  - Digoxin 125mcg daily    Rhythm control agents:not indicated     HFmrEF, chronic  EF:41-45%. NYHA Class III, Stage C. Patient is currently euvolemic and in a well perfused physiologic state. Hemodynamics are acceptable. BP has improved. Weight down 20lbs.   BETA-BLOCKER:   Toprol XL 100mg daily  ACE/ARB:  5mg Lisinopril   ENTRESTO: not indicated  DIURETIC: Lasix 40mg daily  ALDOSTERONTE ANTAGONIST: Not currently indicated.  IMDUR/HYDRALAZINE: N/A  DIGOXIN: 125mcg   Fluid restriction: 2 L  Sodium restriction:2 grams         Leg edema  - improved      Plan for disposition:Where:  home and When:  Today, per attending.    - CARDS follow up: 4 weeks with Dr. Hutchinson. He is encouraged to keep appointment as he did not keep his 7/23/19 appointment.             This document has been electronically signed by CHANTEL Harris on August 5, 2019 10:09 AM

## 2019-08-05 NOTE — PLAN OF CARE
Problem: Patient Care Overview  Goal: Plan of Care Review  Outcome: Ongoing (interventions implemented as appropriate)    Goal: Individualization and Mutuality  Outcome: Ongoing (interventions implemented as appropriate)    Goal: Discharge Needs Assessment  Outcome: Ongoing (interventions implemented as appropriate)    Goal: Interprofessional Rounds/Family Conf  Outcome: Ongoing (interventions implemented as appropriate)      Problem: Fall Risk (Adult)  Goal: Absence of Fall  Outcome: Ongoing (interventions implemented as appropriate)      Problem: Pain, Chronic (Adult)  Goal: Identify Related Risk Factors and Signs and Symptoms  Outcome: Ongoing (interventions implemented as appropriate)    Goal: Acceptable Pain/Comfort Level and Functional Ability  Outcome: Ongoing (interventions implemented as appropriate)      Problem: Urinary Tract Infection (Adult)  Goal: Signs and Symptoms of Listed Potential Problems Will be Absent, Minimized or Managed (Urinary Tract Infection)  Outcome: Ongoing (interventions implemented as appropriate)      Problem: Arrhythmia/Dysrhythmia (Symptomatic) (Adult)  Goal: Signs and Symptoms of Listed Potential Problems Will be Absent, Minimized or Managed (Arrhythmia/Dysrhythmia)  Outcome: Ongoing (interventions implemented as appropriate)      Problem: Cardiac: Heart Failure (Adult)  Goal: Signs and Symptoms of Listed Potential Problems Will be Absent, Minimized or Managed (Cardiac: Heart Failure)  Outcome: Ongoing (interventions implemented as appropriate)

## 2019-08-06 ENCOUNTER — HOSPITAL ENCOUNTER (EMERGENCY)
Facility: HOSPITAL | Age: 64
Discharge: HOME | End: 2019-08-06
Attending: EMERGENCY MEDICINE | Admitting: EMERGENCY MEDICINE

## 2019-08-06 ENCOUNTER — READMISSION MANAGEMENT (OUTPATIENT)
Dept: CALL CENTER | Facility: HOSPITAL | Age: 64
End: 2019-08-06

## 2019-08-06 ENCOUNTER — NURSE TRIAGE (OUTPATIENT)
Dept: CALL CENTER | Facility: HOSPITAL | Age: 64
End: 2019-08-06

## 2019-08-06 ENCOUNTER — APPOINTMENT (OUTPATIENT)
Dept: CT IMAGING | Facility: HOSPITAL | Age: 64
End: 2019-08-06

## 2019-08-06 VITALS
RESPIRATION RATE: 18 BRPM | HEIGHT: 70 IN | TEMPERATURE: 97.6 F | BODY MASS INDEX: 45.1 KG/M2 | WEIGHT: 315 LBS | DIASTOLIC BLOOD PRESSURE: 56 MMHG | HEART RATE: 82 BPM | SYSTOLIC BLOOD PRESSURE: 123 MMHG | OXYGEN SATURATION: 93 %

## 2019-08-06 DIAGNOSIS — R31.9 HEMATURIA, UNSPECIFIED TYPE: ICD-10-CM

## 2019-08-06 DIAGNOSIS — H53.413 VISUAL FIELD SCOTOMA OF BOTH EYES: Primary | ICD-10-CM

## 2019-08-06 LAB
ALBUMIN SERPL-MCNC: 3.9 G/DL (ref 3.5–5.2)
ALBUMIN/GLOB SERPL: 1.2 G/DL
ALP SERPL-CCNC: 63 U/L (ref 39–117)
ALT SERPL W P-5'-P-CCNC: 28 U/L (ref 1–41)
ANION GAP SERPL CALCULATED.3IONS-SCNC: 9 MMOL/L (ref 5–15)
APTT PPP: 30.6 SECONDS (ref 20–40.3)
AST SERPL-CCNC: 19 U/L (ref 1–40)
BACTERIA UR QL AUTO: ABNORMAL /HPF
BASOPHILS # BLD AUTO: 0.09 10*3/MM3 (ref 0–0.2)
BASOPHILS NFR BLD AUTO: 0.8 % (ref 0–1.5)
BILIRUB SERPL-MCNC: 0.6 MG/DL (ref 0.2–1.2)
BILIRUB UR QL STRIP: ABNORMAL
BUN BLD-MCNC: 28 MG/DL (ref 8–23)
BUN/CREAT SERPL: 23.9 (ref 7–25)
CALCIUM SPEC-SCNC: 9.7 MG/DL (ref 8.6–10.5)
CHLORIDE SERPL-SCNC: 99 MMOL/L (ref 98–107)
CLARITY UR: CLEAR
CO2 SERPL-SCNC: 33 MMOL/L (ref 22–29)
COLOR UR: ABNORMAL
CREAT BLD-MCNC: 1.17 MG/DL (ref 0.76–1.27)
DEPRECATED RDW RBC AUTO: 45.4 FL (ref 37–54)
DIGOXIN SERPL-MCNC: 0.5 NG/ML (ref 0.6–1.2)
EOSINOPHIL # BLD AUTO: 0.27 10*3/MM3 (ref 0–0.4)
EOSINOPHIL NFR BLD AUTO: 2.5 % (ref 0.3–6.2)
ERYTHROCYTE [DISTWIDTH] IN BLOOD BY AUTOMATED COUNT: 14.1 % (ref 12.3–15.4)
GFR SERPL CREATININE-BSD FRML MDRD: 63 ML/MIN/1.73
GLOBULIN UR ELPH-MCNC: 3.3 GM/DL
GLUCOSE BLD-MCNC: 110 MG/DL (ref 65–99)
GLUCOSE UR STRIP-MCNC: NEGATIVE MG/DL
HCT VFR BLD AUTO: 47.9 % (ref 37.5–51)
HGB BLD-MCNC: 15.5 G/DL (ref 13–17.7)
HGB UR QL STRIP.AUTO: ABNORMAL
HOLD SPECIMEN: NORMAL
HYALINE CASTS UR QL AUTO: ABNORMAL /LPF
IMM GRANULOCYTES # BLD AUTO: 0.1 10*3/MM3 (ref 0–0.05)
IMM GRANULOCYTES NFR BLD AUTO: 0.9 % (ref 0–0.5)
INR PPP: 1.47 (ref 0.8–1.2)
KETONES UR QL STRIP: ABNORMAL
LEUKOCYTE ESTERASE UR QL STRIP.AUTO: ABNORMAL
LYMPHOCYTES # BLD AUTO: 2.56 10*3/MM3 (ref 0.7–3.1)
LYMPHOCYTES NFR BLD AUTO: 24.2 % (ref 19.6–45.3)
MCH RBC QN AUTO: 28.9 PG (ref 26.6–33)
MCHC RBC AUTO-ENTMCNC: 32.4 G/DL (ref 31.5–35.7)
MCV RBC AUTO: 89.4 FL (ref 79–97)
MONOCYTES # BLD AUTO: 1.03 10*3/MM3 (ref 0.1–0.9)
MONOCYTES NFR BLD AUTO: 9.7 % (ref 5–12)
NEUTROPHILS # BLD AUTO: 6.55 10*3/MM3 (ref 1.7–7)
NEUTROPHILS NFR BLD AUTO: 61.9 % (ref 42.7–76)
NITRITE UR QL STRIP: POSITIVE
NRBC BLD AUTO-RTO: 0 /100 WBC (ref 0–0.2)
PH UR STRIP.AUTO: <=5 [PH] (ref 5–9)
PLAT MORPH BLD: NORMAL
PLATELET # BLD AUTO: 250 10*3/MM3 (ref 140–450)
PMV BLD AUTO: 10.4 FL (ref 6–12)
POTASSIUM BLD-SCNC: 4.1 MMOL/L (ref 3.5–5.2)
PROT SERPL-MCNC: 7.2 G/DL (ref 6–8.5)
PROT UR QL STRIP: ABNORMAL
PROTHROMBIN TIME: 17.6 SECONDS (ref 11.1–15.3)
RBC # BLD AUTO: 5.36 10*6/MM3 (ref 4.14–5.8)
RBC # UR: ABNORMAL /HPF
RBC MORPH BLD: NORMAL
REF LAB TEST METHOD: ABNORMAL
SODIUM BLD-SCNC: 141 MMOL/L (ref 136–145)
SP GR UR STRIP: 1.02 (ref 1–1.03)
SQUAMOUS #/AREA URNS HPF: ABNORMAL /HPF
TROPONIN T SERPL-MCNC: <0.01 NG/ML (ref 0–0.03)
UROBILINOGEN UR QL STRIP: ABNORMAL
WBC MORPH BLD: NORMAL
WBC NRBC COR # BLD: 10.6 10*3/MM3 (ref 3.4–10.8)
WBC UR QL AUTO: ABNORMAL /HPF

## 2019-08-06 PROCEDURE — 85610 PROTHROMBIN TIME: CPT | Performed by: EMERGENCY MEDICINE

## 2019-08-06 PROCEDURE — 93005 ELECTROCARDIOGRAM TRACING: CPT | Performed by: EMERGENCY MEDICINE

## 2019-08-06 PROCEDURE — 85730 THROMBOPLASTIN TIME PARTIAL: CPT | Performed by: EMERGENCY MEDICINE

## 2019-08-06 PROCEDURE — 93010 ELECTROCARDIOGRAM REPORT: CPT | Performed by: INTERNAL MEDICINE

## 2019-08-06 PROCEDURE — 99283 EMERGENCY DEPT VISIT LOW MDM: CPT

## 2019-08-06 PROCEDURE — 99284 EMERGENCY DEPT VISIT MOD MDM: CPT

## 2019-08-06 PROCEDURE — 81001 URINALYSIS AUTO W/SCOPE: CPT | Performed by: EMERGENCY MEDICINE

## 2019-08-06 PROCEDURE — 85007 BL SMEAR W/DIFF WBC COUNT: CPT | Performed by: EMERGENCY MEDICINE

## 2019-08-06 PROCEDURE — 80053 COMPREHEN METABOLIC PANEL: CPT | Performed by: EMERGENCY MEDICINE

## 2019-08-06 PROCEDURE — 85025 COMPLETE CBC W/AUTO DIFF WBC: CPT | Performed by: EMERGENCY MEDICINE

## 2019-08-06 PROCEDURE — 84484 ASSAY OF TROPONIN QUANT: CPT | Performed by: EMERGENCY MEDICINE

## 2019-08-06 PROCEDURE — 80162 ASSAY OF DIGOXIN TOTAL: CPT | Performed by: EMERGENCY MEDICINE

## 2019-08-06 PROCEDURE — 70450 CT HEAD/BRAIN W/O DYE: CPT

## 2019-08-06 RX ORDER — SODIUM CHLORIDE 0.9 % (FLUSH) 0.9 %
10 SYRINGE (ML) INJECTION AS NEEDED
Status: DISCONTINUED | OUTPATIENT
Start: 2019-08-06 | End: 2019-08-07 | Stop reason: HOSPADM

## 2019-08-06 NOTE — TELEPHONE ENCOUNTER
Patient was difficult to triage, was poor historian concerning symptoms, had blurring of vision with dizziness which has lasted 5 hours,  Better now, symptoms started after taking a medication  But does not know the name of the medication.    Reason for Disposition  • Patient sounds very sick or weak to the triager    Additional Information  • Negative: Shock suspected (e.g., cold/pale/clammy skin, too weak to stand)  • Negative: Difficult to awaken or acting confused (e.g., disoriented, slurred speech)  • Negative: Fainted, and still feels dizzy afterwards  • Negative: Severe difficulty breathing (e.g., struggling for each breath, speaks in single words)  • Negative: Overdose (accidental or intentional) of medications  • Negative: New neurologic deficit that is present now: * Weakness of the face, arm, or leg on one side of the body * Numbness of the face, arm, or leg on one side of the body * Loss of speech or garbled speech  • Negative: Heart beating < 50 beats per minute OR > 140 beats per minute  • Negative: Chest pain  • Negative: Rectal bleeding, bloody stool, or tarry-black stool  • Negative: Vomiting is the main symptom  • Negative: Diarrhea is the main symptom  • Negative: Headache is the main symptom  • Negative: SEVERE dizziness (e.g., unable to stand, requires support to walk, feels like passing out now)  • Negative: Severe headache  • Negative: Extra heart beats OR irregular heart beating (i.e., 'palpitations')  • Negative: Difficulty breathing  • Negative: Drinking very little and has signs of dehydration (e.g., no urine > 12 hours, very dry mouth, very lightheaded)  • Negative: Follows bleeding (e.g., stomach, rectum, vagina) (Exception: became dizzy from sight of small amount blood)  • Negative: Lightheadedness (dizziness) present now, after 2 hours of rest and fluids  • Negative: Spinning or tilting sensation (vertigo) present now  • Negative: Fever > 103 F (39.4 C)  • Negative: Fever > 100.0 F  "(37.8 C) and has diabetes mellitus or a weak immune system (e.g., HIV positive, cancer chemotherapy, organ transplant, splenectomy, chronic steroids)  • Negative: Sounds like a life-threatening emergency to the triager    Answer Assessment - Initial Assessment Questions  1. DESCRIPTION: \"Describe your dizziness.\"      Had an episode of dizziness and blurring of vision  2. LIGHTHEADED: \"Do you feel lightheaded?\" (e.g., somewhat faint, woozy, weak upon standing)      no  3. VERTIGO: \"Do you feel like either you or the room is spinning or tilting?\" (i.e. vertigo)      no  4. SEVERITY: \"How bad is it?\"  \"Do you feel like you are going to faint?\" \"Can you stand and walk?\"    - MILD - walking normally    - MODERATE - interferes with normal activities (e.g., work, school)     - SEVERE - unable to stand, requires support to walk, feels like passing out now.       moderate  5. ONSET:  \"When did the dizziness begin?\"      5 hours  6. AGGRAVATING FACTORS: \"Does anything make it worse?\" (e.g., standing, change in head position)      Was in the heat  7. HEART RATE: \"Can you tell me your heart rate?\" \"How many beats in 15 seconds?\"  (Note: not all patients can do this)        na  8. CAUSE: \"What do you think is causing the dizziness?\"       In the heat and walking  9. RECURRENT SYMPTOM: \"Have you had dizziness before?\" If so, ask: \"When was the last time?\" \"What happened that time?\"      no  10. OTHER SYMPTOMS: \"Do you have any other symptoms?\" (e.g., fever, chest pain, vomiting, diarrhea, bleeding)        no  11. PREGNANCY: \"Is there any chance you are pregnant?\" \"When was your last menstrual period?\"        kimebrli    Protocols used: DIZZINESS-ADULT-OH      "

## 2019-08-06 NOTE — OUTREACH NOTE
Prep Survey      Responses   Facility patient discharged from?  [facility]   Is patient eligible?  Yes   Discharge diagnosis  Chronic afib., AYANA, hematuria, leg edema, morbid obesity, BPH, HTN   Does the patient have one of the following disease processes/diagnoses(primary or secondary)?  CHF   Does the patient have Home health ordered?  No   Is there a DME ordered?  No   Comments regarding appointments  See AVS   Prep survey completed?  Yes          Rekha Garcia RN

## 2019-08-07 ENCOUNTER — READMISSION MANAGEMENT (OUTPATIENT)
Dept: CALL CENTER | Facility: HOSPITAL | Age: 64
End: 2019-08-07

## 2019-08-07 NOTE — OUTREACH NOTE
CHF Week 1 Survey      Responses   Facility patient discharged from?  [facility]   Does the patient have one of the following disease processes/diagnoses(primary or secondary)?  CHF   Is there a successful TCM telephone encounter documented?  No   CHF Week 1 attempt successful?  No   Unsuccessful attempts  Attempt 1          Zoë Horvath RN

## 2019-08-07 NOTE — DISCHARGE INSTRUCTIONS
Followup with Dr. Highwood tomorrow as scheduled.  Return with any new or worsening symptoms, or any concerns.

## 2019-08-07 NOTE — ED NOTES
Pt concerned with the new meds he has been taking since he got discharged from the hospital 2 days ago.      Farida Franks RN  08/06/19 2009

## 2019-08-07 NOTE — ED PROVIDER NOTES
Subjective    Presents emergency department with complaint of dizziness and some vision changes today.  Patient noted the dizziness is almost a a light type sensation.  There is no flashes or floaters per se but the patient describes it as a yellowing of his vision.  Patient has been started on some new medications on his recent hospitalization.  Patient is asymptomatic at this time.  Patient notes the symptoms lasted for hours.  Patient denies any fevers or chills.  Patient notes that earlier today he did have a twinge of chest pain.  This is not out of the ordinary for the patient.  Patient does have a history of heart failure and chronic atrial fibrillation.  Patient with his new medications was called told to come to the emergency department for further evaluation.  Again, patient is asymptomatic at this time.            Review of Systems   Constitutional: Negative.  Negative for appetite change, chills and fever.   HENT: Negative.  Negative for congestion.    Eyes: Positive for visual disturbance. Negative for photophobia.   Respiratory: Positive for chest tightness. Negative for cough and shortness of breath.    Cardiovascular: Negative.  Negative for chest pain and palpitations.   Gastrointestinal: Negative.  Negative for abdominal pain, constipation, diarrhea, nausea and vomiting.   Endocrine: Negative.    Genitourinary: Negative.  Negative for decreased urine volume, dysuria, flank pain and hematuria.   Musculoskeletal: Negative.  Negative for arthralgias, back pain, myalgias, neck pain and neck stiffness.   Skin: Negative.  Negative for pallor.   Neurological: Negative.  Negative for dizziness, syncope, weakness, light-headedness, numbness and headaches.   Psychiatric/Behavioral: Negative.  Negative for confusion and suicidal ideas. The patient is not nervous/anxious.    All other systems reviewed and are negative.      Past Medical History:   Diagnosis Date   • BPH (benign prostatic hyperplasia)    •  Chronic atrial fibrillation (CMS/HCC)    • Chronic systolic heart failure (CMS/HCC)    • GERD (gastroesophageal reflux disease)    • Hypercholesterolemia    • Hypertension    • Morbid obesity (CMS/HCC)    • Neuropathy    • Sleep apnea        No Known Allergies    Past Surgical History:   Procedure Laterality Date   • BACK SURGERY     • INGUINAL HERNIA REPAIR Left    • NECK SURGERY     • SHOULDER SURGERY Right    • UMBILICAL HERNIA REPAIR         Family History   Problem Relation Age of Onset   • Hypertension Father        Social History     Socioeconomic History   • Marital status:      Spouse name: Not on file   • Number of children: Not on file   • Years of education: Not on file   • Highest education level: Not on file   Tobacco Use   • Smoking status: Former Smoker     Last attempt to quit:      Years since quittin.6   • Smokeless tobacco: Never Used   Substance and Sexual Activity   • Alcohol use: Yes   • Drug use: No   • Sexual activity: Not Currently           Objective   Physical Exam   Constitutional: He is oriented to person, place, and time. He appears well-developed and well-nourished. No distress.   HENT:   Head: Normocephalic and atraumatic.   Eyes: Conjunctivae and EOM are normal. Pupils are equal, round, and reactive to light.   Neck: Normal range of motion. Neck supple. No JVD present.   Cardiovascular: Normal rate, regular rhythm, normal heart sounds and intact distal pulses. Exam reveals no gallop and no friction rub.   No murmur heard.  Pulmonary/Chest: Effort normal. No respiratory distress. He has no wheezes. He has no rales. He exhibits no tenderness.   Abdominal: Soft. Bowel sounds are normal. He exhibits no distension and no mass. There is no tenderness. There is no rebound and no guarding.   Musculoskeletal: Normal range of motion.   Neurological: He is alert and oriented to person, place, and time.   Skin: Skin is warm and dry. Capillary refill takes less than 2 seconds.    Psychiatric: He has a normal mood and affect. His behavior is normal. Judgment and thought content normal.   Nursing note and vitals reviewed.      Procedures           ED Course      Labs Reviewed   COMPREHENSIVE METABOLIC PANEL - Abnormal; Notable for the following components:       Result Value    Glucose 110 (*)     BUN 28 (*)     CO2 33.0 (*)     All other components within normal limits    Narrative:     GFR Normal >60  Chronic Kidney Disease <60  Kidney Failure <15   PROTIME-INR - Abnormal; Notable for the following components:    Protime 17.6 (*)     INR 1.47 (*)     All other components within normal limits    Narrative:     Therapeutic range for most indications is 2.0-3.0 INR,  or 2.5-3.5 for mechanical heart valves.   DIGOXIN LEVEL - Abnormal; Notable for the following components:    Digoxin 0.50 (*)     All other components within normal limits   CBC WITH AUTO DIFFERENTIAL - Abnormal; Notable for the following components:    Immature Grans % 0.9 (*)     Monocytes, Absolute 1.03 (*)     Immature Grans, Absolute 0.10 (*)     All other components within normal limits   URINALYSIS W/ MICROSCOPIC IF INDICATED (NO CULTURE) - Abnormal; Notable for the following components:    Color, UA Red (*)     Ketones, UA Trace (*)     Bilirubin, UA Small (1+) (*)     Blood, UA Large (3+) (*)     Protein, UA Trace (*)     Leuk Esterase, UA Small (1+) (*)     Nitrite, UA Positive (*)     All other components within normal limits   URINALYSIS, MICROSCOPIC ONLY - Abnormal; Notable for the following components:    RBC, UA Too Numerous to Count (*)     All other components within normal limits   APTT - Normal    Narrative:     The recommended Heparin therapeutic range is 68-97 seconds.   TROPONIN (IN-HOUSE) - Normal    Narrative:     Troponin T Reference Range:  <= 0.03 ng/mL-   Negative for AMI  >0.03 ng/mL-     Abnormal for myocardial necrosis.  Clinicians would have to utilize clinical acumen, EKG, Troponin and serial  changes to determine if it is an Acute Myocardial Infarction or myocardial injury due to an underlying chronic condition.    SCAN SLIDE - Normal   CBC AND DIFFERENTIAL    Narrative:     The following orders were created for panel order CBC & Differential.  Procedure                               Abnormality         Status                     ---------                               -----------         ------                     Scan Slide[[id number]]                   Normal              Final result               CBC Auto Differential[[id number]]        Abnormal            Final result                 Please view results for these tests on the individual orders.   EXTRA TUBES    Narrative:     The following orders were created for panel order Extra Tubes.  Procedure                               Abnormality         Status                     ---------                               -----------         ------                     Gold Top - SST[[id number]]                                   Final result                 Please view results for these tests on the individual orders.   GOLD TOP - SST       CT Head Without Contrast   Final Result      There is no acute intracranial abnormality.      If clinical concern exists regarding an acute ischemic/vascular   pathology being responsible for patient's symptomatology, an MRI   of the brain is more sensitive than the current study, in ruling   out such a possibility.      Electronically signed by:  Bereket Jackson MD  8/6/2019 10:23 PM CDT   Workstation: [id number]        Patient continues asymptomatic at this time.  Patient has follow-up tomorrow for hisHematuria in the morning with Dr. Parsons.  Patient be discharged to outpatient follow-up.  No acute findings.          MDM      Final diagnoses:   Visual field scotoma of both eyes   Hematuria, unspecified type           Brad Díaz MD  08/06/19 4512

## 2019-08-08 ENCOUNTER — READMISSION MANAGEMENT (OUTPATIENT)
Dept: CALL CENTER | Facility: HOSPITAL | Age: 64
End: 2019-08-08

## 2019-08-08 NOTE — OUTREACH NOTE
CHF Week 1 Survey      Responses   Facility patient discharged from?  [facility]   Does the patient have one of the following disease processes/diagnoses(primary or secondary)?  CHF   Is there a successful TCM telephone encounter documented?  No   CHF Week 1 attempt successful?  No   Unsuccessful attempts  Attempt 2          Lilian Kiran RN

## 2019-08-13 ENCOUNTER — READMISSION MANAGEMENT (OUTPATIENT)
Dept: CALL CENTER | Facility: HOSPITAL | Age: 64
End: 2019-08-13

## 2019-08-13 NOTE — OUTREACH NOTE
CHF Week 2 Survey      Responses   Facility patient discharged from?  [facility]   Does the patient have one of the following disease processes/diagnoses(primary or secondary)?  CHF   Week 2 attempt successful?  No   Unsuccessful attempts  Attempt 1          Kate Rosen RN

## 2019-08-14 ENCOUNTER — READMISSION MANAGEMENT (OUTPATIENT)
Dept: CALL CENTER | Facility: HOSPITAL | Age: 64
End: 2019-08-14

## 2019-08-14 NOTE — OUTREACH NOTE
CHF Week 2 Survey      Responses   Facility patient discharged from?  [facility]   Does the patient have one of the following disease processes/diagnoses(primary or secondary)?  CHF   Week 2 attempt successful?  No   Unsuccessful attempts  Attempt 2          Yuly Peguero RN

## 2019-08-19 ENCOUNTER — READMISSION MANAGEMENT (OUTPATIENT)
Dept: CALL CENTER | Facility: HOSPITAL | Age: 64
End: 2019-08-19

## 2019-08-19 NOTE — OUTREACH NOTE
CHF Week 3 Survey      Responses   Facility patient discharged from?  [facility]   Does the patient have one of the following disease processes/diagnoses(primary or secondary)?  CHF   Week 3 attempt successful?  No   Unsuccessful attempts  Attempt 1          Layne Nuñez RN

## 2019-08-21 ENCOUNTER — READMISSION MANAGEMENT (OUTPATIENT)
Dept: CALL CENTER | Facility: HOSPITAL | Age: 64
End: 2019-08-21

## 2019-08-21 NOTE — OUTREACH NOTE
CHF Week 3 Survey      Responses   Facility patient discharged from?  [facility]   Does the patient have one of the following disease processes/diagnoses(primary or secondary)?  CHF   Week 3 attempt successful?  Yes   Call start time  0947   Call end time  1004   Discharge diagnosis  Chronic afib., AYANA, hematuria, leg edema, morbid obesity, BPH, HTN   Meds reviewed with patient/caregiver?  Yes   Is the patient having any side effects they believe may be caused by any medication additions or changes?  No   Does the patient have all medications ordered at discharge?  Yes   Is the patient taking all medications as directed (includes completed medication regime)?  Yes   Does the patient have a primary care provider?   Yes   Does the patient have an appointment with their PCP within 7 days of discharge?  Yes   Has the patient kept scheduled appointments due by today?  Yes   Comments  Saw PCP.  Does not plan to keep appt with cardiology, patient is moving.   Has home health visited the patient within 72 hours of discharge?  N/A   Psychosocial issues?  No   Did the patient receive a copy of their discharge instructions?  Yes   Nursing interventions  Reviewed instructions with patient   What is the patient's perception of their health status since discharge?  Improving   Nursing interventions  Nurse provided patient education [Patient unaware of heart problems. He says his doctor told him he has a strong heart. Discussed heart and heart medications. ]   Is the patient weighing daily?  No   Does the patient have scales?  No   Daily weight interventions  Education provided on importance of daily weight   Is the patient able to teach back Heart Failure diet management?  Yes [Na and fluid restriction]   Is the patient able to teach back Heart Failure Zones?  No   Is the patient able to teach back signs and symptoms of worsening condition? (i.e. weight gain, shortness of air, etc.)  Yes   Is the patient/caregiver able to teach  back the hierarchy of who to call/visit for symptoms/problems? PCP, Specialist, Home health nurse, Urgent Care, ED, 911  Yes   Additional teach back comments  Patient in process of moving. He is moving to another [place]. He will be changing to his brothers MD. He is not going to follow up with current MD appts.    CHF Week 3 call completed?  Yes   Revoked  No further contact(revokes)-requires comment   Graduated/Revoked comments  Moving to another state. Will not follow up with current MD's. No further calls necessary          Sahara Peguero RN